# Patient Record
Sex: FEMALE | Race: BLACK OR AFRICAN AMERICAN | Employment: UNEMPLOYED | URBAN - METROPOLITAN AREA
[De-identification: names, ages, dates, MRNs, and addresses within clinical notes are randomized per-mention and may not be internally consistent; named-entity substitution may affect disease eponyms.]

---

## 2017-03-21 ENCOUNTER — HOSPITAL ENCOUNTER (OUTPATIENT)
Age: 47
Discharge: HOME | End: 2017-03-21
Attending: FAMILY MEDICINE
Payer: COMMERCIAL

## 2017-03-21 DIAGNOSIS — M25.469 KNEE SWELLING: ICD-10-CM

## 2017-03-21 DIAGNOSIS — M25.561 RIGHT KNEE PAIN: ICD-10-CM

## 2017-03-21 PROCEDURE — 73721 MRI JNT OF LWR EXTRE W/O DYE: CPT

## 2017-03-29 ENCOUNTER — OFFICE VISIT (OUTPATIENT)
Dept: ORTHOPEDIC SURGERY | Age: 47
End: 2017-03-29

## 2017-03-29 VITALS
WEIGHT: 254 LBS | TEMPERATURE: 98 F | DIASTOLIC BLOOD PRESSURE: 100 MMHG | SYSTOLIC BLOOD PRESSURE: 134 MMHG | HEART RATE: 70 BPM

## 2017-03-29 DIAGNOSIS — S83.281A ACUTE LATERAL MENISCUS TEAR OF RIGHT KNEE, INITIAL ENCOUNTER: ICD-10-CM

## 2017-03-29 DIAGNOSIS — S83.241A ACUTE MEDIAL MENISCUS TEAR, RIGHT, INITIAL ENCOUNTER: Primary | ICD-10-CM

## 2017-03-29 RX ORDER — HYDROCODONE BITARTRATE AND ACETAMINOPHEN 5; 325 MG/1; MG/1
1 TABLET ORAL
Qty: 40 TAB | Refills: 0 | Status: SHIPPED | OUTPATIENT
Start: 2017-03-29 | End: 2017-03-29

## 2017-03-29 NOTE — LETTER
NOTIFICATION RETURN TO WORK / SCHOOL 
 
3/29/2017 1:38 PM 
 
Ms. Ramonita Jiménez [id number] [facility] [address] Shaye [address] [address] To Whom It May Concern: 
 
Ramonita Jiménez is [facility]. She will be under going surgery soon and will remain out of work up until her surgery date. She will then be out of work for 4 weeks following surgery. If there are questions or concerns please have the patient contact our office. Sincerely, Kyle Langley MD

## 2017-03-29 NOTE — PROGRESS NOTES
Salud Guajardo  1970   Chief Complaint   Patient presents with    Knee Pain     Right        HISTORY OF PRESENT ILLNESS  Salud Guajardo is a 40 y.o. female who presents today for evaluation of right knee pain. She rates her pain 10/10 today. Patient was referred to me by Dr. Chelo Fitzgerald for further evaluation. She has completed an MRI of the knee. She recalls a history of falls. She has taken 800 mg Ibuprofen with relief until recently. She notes diffuse pain throughout the knee. She presents to the office with a crutch today. 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 No Known Allergies     History reviewed. No pertinent past medical history. Social History     Social History    Marital status:      Spouse name: N/A    Number of children: N/A    Years of education: N/A     Occupational History    Not on file. Social History Main Topics    Smoking status: Never Smoker    Smokeless tobacco: Not on file    Alcohol use No    Drug use: Not on file    Sexual activity: Not on file     Other Topics Concern    Not on file     Social History Narrative    No narrative on file      History reviewed. No pertinent surgical history. History reviewed. No pertinent family history. Current Outpatient Prescriptions   Medication Sig    HYDROcodone-acetaminophen (NORCO) 5-325 mg per tablet Take 1 Tab by mouth every six (6) hours as needed for Pain. Max Daily Amount: 4 Tabs.  levothyroxine (SYNTHROID) 150 mcg tablet Take 150 mcg by mouth daily.  metoprolol tartrate (LOPRESSOR) 25 mg tablet Take 25 mg by mouth daily.  metFORMIN (GLUCOPHAGE) 1,000 mg tablet Take 1,000 mg by mouth daily.  calcium-vitamin D (OYSTER SHELL) 500 mg(1,250mg) -200 unit per tablet Take 1 Tab by mouth daily.  traMADol (ULTRAM) 50 mg tablet Take 1 Tab by mouth every six (6) hours as needed for Pain. Max Daily Amount: 200 mg. No current facility-administered medications for this visit. REVIEW OF SYSTEM   Patient denies: Weight loss, Fever/Chills, HA, Visual changes, Fatigue, Chest pain, SOB, Abdominal pain, N/V/D/C, Blood in stool or urine, Edema. Pertinent positive as above in HPI. All others were negative    PHYSICAL EXAM:   Visit Vitals    BP (!) 134/100 (BP 1 Location: Left arm, BP Patient Position: Sitting)    Pulse 70    Temp 98 °F (36.7 °C) (Oral)    Wt 254 lb (115.2 kg)     The patient is a well-developed, well-nourished female   in no acute distress. The patient is alert and oriented times three. The patient is alert and oriented times three. Mood and affect are normal.  LYMPHATIC: lymph nodes are not enlarged and are within normal limits  SKIN: normal in color and non tender to palpation. There are no bruises or abrasions noted. NEUROLOGICAL: Motor sensory exam is within normal limits. Reflexes are equal bilaterally. There is normal sensation to pinprick and light touch  MUSCULOSKELETAL:  Examination Right knee   Skin Intact   Range of motion 0-130   Effusion +, Mild   Medial joint line tenderness +   Lateral joint line tenderness -   Tenderness Pes Bursa -   Tenderness insertion MCL -   Tenderness insertion LCL -   Oralias -   Patella crepitus -   Patella grind -   Lachman -   Pivot shift -   Anterior drawer -   Posterior drawer -   Varus stress -   Valgus stress -   Neurovascular Intact   Calf Swelling and Tenderness to Palpation -   Elliot's Test -   Hamstring Cord Tightness -          IMAGING: MRI of the right knee dated 3/21/17 was reviewed and read:   IMPRESSION: Limited study due to motion artifacts. 1. Radial tear in the posterior horn medial meniscus at the meniscal root with  medial subluxation of the medial meniscus. Focal cartilage tear/delamination in  anterior medial femoral condyle  2. Suspect chronic tear or sprain of ACL. 3. Flap tear in the posterior horn lateral meniscus.   4. Joint effusion and small popliteal cyst. Chondromalacia patella, grade 2.  Increased TT-TG distance. IMPRESSION:      ICD-10-CM ICD-9-CM    1. Acute medial meniscus tear, right, initial encounter S83.241A 836.0 HYDROcodone-acetaminophen (NORCO) 5-325 mg per tablet   2. Acute lateral meniscus tear of right knee, initial encounter S83.281A 836.1 HYDROcodone-acetaminophen (NORCO) 5-325 mg per tablet        PLAN: The MRI that accompanied the patient showed arthritic changes in the knee and tears of both the lateral and medial meniscus. She will be given a note to be out of work for 4 weeks after her surgery. I discussed the risks and benefits and potential adverse outcomes of both operative vs non operative treatment of medial and lateral meniscal tears of the right kneewith the patient. Patient wishes to proceed with arthroscopic right knee medial and lateral meniscectomy. Risks of operative intervention include but not limited to bleeding, infection, deep vein thrombosis, pulmonary embolism, death, limb length discrepancy, reflexive sympathetic dystrophy, fat embolism syndrome,damage to blood vessels and nerves, malunion, non-union, delayed union, failure of hardware, post traumatic arthritis, stroke, heart attack, and death. Patient understands that infection may arise and may require numerous surgeries. History and physical exam scheduled for a later date. Follow-up Disposition: Not on File    Scribed by Christelle Dumont [facility]) as dictated by MD MICHAELLE Brice, Dr. Mattie Moulton, confirm that all documentation is accurate.     Mattie Moulton M.D.   [facility]

## 2017-04-03 DIAGNOSIS — S83.281A ACUTE LATERAL MENISCUS TEAR OF RIGHT KNEE, INITIAL ENCOUNTER: ICD-10-CM

## 2017-04-03 DIAGNOSIS — S83.241A ACUTE MEDIAL MENISCUS TEAR OF RIGHT KNEE, INITIAL ENCOUNTER: Primary | ICD-10-CM

## 2017-04-04 ENCOUNTER — HOSPITAL ENCOUNTER (OUTPATIENT)
Dept: LAB | Age: 47
Discharge: HOME | End: 2017-04-04
Payer: MEDICAID

## 2017-04-04 DIAGNOSIS — Z01.818 PREOP EXAMINATION: ICD-10-CM

## 2017-04-04 DIAGNOSIS — Z01.818 PREOP EXAMINATION: Primary | ICD-10-CM

## 2017-04-04 LAB
ANION GAP BLD CALC-SCNC: 6 MMOL/L (ref 3–18)
ATRIAL RATE: 72 BPM
BASOPHILS # BLD AUTO: 0 K/UL (ref 0–0.06)
BASOPHILS # BLD: 0 % (ref 0–2)
BUN SERPL-MCNC: 11 MG/DL (ref 7–18)
BUN/CREAT SERPL: 20 (ref 12–20)
CALCIUM SERPL-MCNC: 9.1 MG/DL (ref 8.5–10.1)
CALCULATED P AXIS, ECG09: 55 DEGREES
CALCULATED R AXIS, ECG10: 16 DEGREES
CALCULATED T AXIS, ECG11: 34 DEGREES
CHLORIDE SERPL-SCNC: 101 MMOL/L (ref 100–108)
CO2 SERPL-SCNC: 32 MMOL/L (ref 21–32)
CREAT SERPL-MCNC: 0.55 MG/DL (ref 0.6–1.3)
DIAGNOSIS, 93000: NORMAL
DIFFERENTIAL METHOD BLD: ABNORMAL
EOSINOPHIL # BLD: 0 K/UL (ref 0–0.4)
EOSINOPHIL NFR BLD: 0 % (ref 0–5)
ERYTHROCYTE [DISTWIDTH] IN BLOOD BY AUTOMATED COUNT: 14.9 % (ref 11.6–14.5)
GLUCOSE SERPL-MCNC: 118 MG/DL (ref 74–99)
HCT VFR BLD AUTO: 38.1 % (ref 35–45)
HGB BLD-MCNC: 12.4 G/DL (ref 12–16)
LYMPHOCYTES # BLD AUTO: 34 % (ref 21–52)
LYMPHOCYTES # BLD: 2.2 K/UL (ref 0.9–3.6)
MCH RBC QN AUTO: 28.1 PG (ref 24–34)
MCHC RBC AUTO-ENTMCNC: 32.5 G/DL (ref 31–37)
MCV RBC AUTO: 86.2 FL (ref 74–97)
MONOCYTES # BLD: 0.5 K/UL (ref 0.05–1.2)
MONOCYTES NFR BLD AUTO: 8 % (ref 3–10)
NEUTS SEG # BLD: 3.8 K/UL (ref 1.8–8)
NEUTS SEG NFR BLD AUTO: 58 % (ref 40–73)
P-R INTERVAL, ECG05: 142 MS
PLATELET # BLD AUTO: 284 K/UL (ref 135–420)
PMV BLD AUTO: 9.8 FL (ref 9.2–11.8)
POTASSIUM SERPL-SCNC: 4 MMOL/L (ref 3.5–5.5)
Q-T INTERVAL, ECG07: 372 MS
QRS DURATION, ECG06: 80 MS
QTC CALCULATION (BEZET), ECG08: 407 MS
RBC # BLD AUTO: 4.42 M/UL (ref 4.2–5.3)
SODIUM SERPL-SCNC: 139 MMOL/L (ref 136–145)
VENTRICULAR RATE, ECG03: 72 BPM
WBC # BLD AUTO: 6.5 K/UL (ref 4.6–13.2)

## 2017-04-04 PROCEDURE — 36415 COLL VENOUS BLD VENIPUNCTURE: CPT | Performed by: ORTHOPAEDIC SURGERY

## 2017-04-04 PROCEDURE — 93005 ELECTROCARDIOGRAM TRACING: CPT

## 2017-04-04 PROCEDURE — 85025 COMPLETE CBC W/AUTO DIFF WBC: CPT | Performed by: ORTHOPAEDIC SURGERY

## 2017-04-04 PROCEDURE — 80048 BASIC METABOLIC PNL TOTAL CA: CPT | Performed by: ORTHOPAEDIC SURGERY

## 2017-04-11 ENCOUNTER — OFFICE VISIT (OUTPATIENT)
Dept: ORTHOPEDIC SURGERY | Age: 47
End: 2017-04-11

## 2017-04-11 VITALS
DIASTOLIC BLOOD PRESSURE: 74 MMHG | HEIGHT: 62 IN | SYSTOLIC BLOOD PRESSURE: 111 MMHG | HEART RATE: 88 BPM | TEMPERATURE: 98.1 F

## 2017-04-11 DIAGNOSIS — S83.241D ACUTE MEDIAL MENISCUS TEAR, RIGHT, SUBSEQUENT ENCOUNTER: Primary | ICD-10-CM

## 2017-04-11 DIAGNOSIS — S83.281D ACUTE LATERAL MENISCUS TEAR OF RIGHT KNEE, SUBSEQUENT ENCOUNTER: ICD-10-CM

## 2017-04-11 RX ORDER — HYDROCODONE BITARTRATE AND ACETAMINOPHEN 7.5; 325 MG/1; MG/1
1-2 TABLET ORAL
Qty: 40 TAB | Refills: 0 | Status: SHIPPED | OUTPATIENT
Start: 2017-04-11 | End: 2020-05-29

## 2017-04-11 RX ORDER — IBUPROFEN 800 MG/1
800 TABLET ORAL
Qty: 60 TAB | Refills: 0 | Status: SHIPPED | OUTPATIENT
Start: 2017-04-11 | End: 2017-04-24 | Stop reason: CLARIF

## 2017-04-12 ENCOUNTER — TELEPHONE (OUTPATIENT)
Dept: ORTHOPEDIC SURGERY | Age: 47
End: 2017-04-12

## 2017-04-12 NOTE — TELEPHONE ENCOUNTER
[address] [place] [address] CALLED FOR DR. Marv Wang. MEGAN SAID THAT THE PATIENT IS HAVING SURGERY DONE ON 04/14/17 BY DR. Marv Wang. MEGAN SAID THE PATIENT IS GOING TO NEED A WALKER UPON DISCHARGE FROM THE HOSPITAL. MEGAN TEL. [phone]. PATIENT TEL. [phone].

## 2017-04-12 NOTE — TELEPHONE ENCOUNTER
Spoke with [employer]. Vilma states that there needs to be an order for a walker placed in [facility] care for pt by the day of discharge.

## 2017-04-13 ENCOUNTER — ANESTHESIA EVENT (OUTPATIENT)
Dept: SURGERY | Age: 47
End: 2017-04-13
Payer: COMMERCIAL

## 2017-04-13 NOTE — DISCHARGE INSTRUCTIONS
Learning About How to Use a [address] Instructions  Using a walker can help you move with less pain and more stability. A walker can help you be more independent and safe as you do your daily activities. Be sure your walker fits you. When you stand up in your normal posture and rest your hands on the walker's hand , your hands should be even with the tops of your legs. Your elbows should be slightly bent. To stay safe when using a walker:  · Look straight ahead, not down at your feet. · Clear away small rugs, cords, or anything else that could cause you to trip, slip, or fall. · Be very careful around pets and small children. They can get in your path when you least expect it. · Be sure the rubber tips on your walker are clean and in good condition to help prevent slipping. · Avoid slick conditions, such as wet floors and snowy or icy driveways. In bad weather, be especially careful on curbs and steps. How to use a walker  Getting started    1. Set the walker at arm's length in front of you, with all four legs on the floor. If your walker has wheels on the front legs, push the walker forward so it's an arm's length in front of you. Walking with a walker    1. Use the handles of the walker for balance as you move your weak or injured leg forward to the middle area of the walker. Don't step all the way to the front. 2. Push straight down on the handles of the walker as you bring your strong leg up, so it is even with your injured leg. 3. Repeat. Getting out of a chair and sitting down    1. To get out of a chair, use both hands and push against the arms of your chair. Then put both hands on your walker. 2. Don't use your walker to help you stand up or sit down. 3. To sit, back up to the chair. Touch the back of your legs to the chair. 4. Support most of your weight on your strong leg, and reach back for the arms of the chair.   5. Slowly and carefully lower yourself into the chair.  Going up and down a curb    The first few times you try this, have another person nearby to steady you if needed. 1. Stand as close to the edge as you can while keeping all four legs of the walker on the surface you're standing on.  2. When you have your balance, move the walker up or down to the surface you are moving to. 3. Push straight down on the handles for balance and to take weight off your injured leg. 4. If you are going up, step up with your stronger leg first, and then bring your weaker or injured leg up to meet it. If you are going down, step down with your weaker leg first, and then bring your stronger leg down to meet it. (Remember \"up with the good, and down with the bad\" to help you lead with the correct leg.)  5. Get your balance again before you start to walk. Follow-up care is a key part of your treatment and safety. Be sure to make and go to all appointments, and call your doctor if you are having problems. It's also a good idea to know your test results and keep a list of the medicines you take. Where can you learn more? Go to http://jerilyn-erin.info/. Enter M137 in the search box to learn more about \"Learning About How to Use a Walker. \"  Current as of: August 4, 2016  Content Version: 11.2  © 0822-7340 [employer]. Care instructions adapted under license by [employer] (which disclaims liability or warranty for this information). If you have questions about a medical condition or this instruction, always ask your healthcare professional. [place] [address] any warranty or liability for your use of this information. Dr. Carrasquillo Trevino Knee Arthroscopy  Postoperative Information    You will be given a prescription for pain medication. It may be taken every 4-6 hours as needed for the first 4-5 days. You may elevate your leg and place an ice pack on top of the dressing in  order to prevent swelling.     A soft bandage was placed on your knee to soak up blood and fluid. You may take the bandage off the day after surgery, carefully cleaning the incision sites with peroxide. Band-Aids should be used for the first 4-5 days over each incision. There are 2 small incisions in your knee that may be sore and develop bruising over the next several days. This should resolve over the next few weeks. No special care will be needed. You should expect swelling in the area. You may elevate your leg and apply an icepack on top of the dressing to help minimize the swelling. Deep massage to the lower leg may also be utilized. It is safe to take a shower two days after surgery. You may begin bearing weight on your leg with the use of crutches for the first 4-5 days. Apply as much weight as tolerated so that at the end of 4-5 days, you can walk without crutches. Avoid prolonged walking or standing during the first few weeks after surgery. During your first week please work on gentle range of motion of your knee. Even though your incisions are small, there has been an operation inside and around the knee joint. Complete healing may take several months. If you have a high temperature, unexpected pain, redness or swelling, or any drainage around your knee area, please call my office immediately. Please make an appointment to return to my office in one week.     Dr. Brody Hawthorne office number Zoila Rhyme from Nurse    The following personal items are in your possession at time of discharge:    Dental Appliances: None  Visual Aid: Glasses     Home Medications: None  Jewelry: None  Clothing: Undergarments, Footwear (dress)  Other Valuables: None             PATIENT INSTRUCTIONS:    After general anesthesia or intravenous sedation, for 24 hours or while taking prescription Narcotics:  · Limit your activities  · Do not drive and operate hazardous machinery  · Do not make important personal or business decisions  · Do  not drink alcoholic beverages  · If you have not urinated within 8 hours after discharge, please contact your surgeon on call. Report the following to your surgeon:  · Excessive pain, swelling, redness or odor of or around the surgical area  · Temperature over 100.5  · Nausea and vomiting lasting longer than 4 hours or if unable to take medications  · Any signs of decreased circulation or nerve impairment to extremity: change in color, persistent  numbness, tingling, coldness or increase pain  · Any questions              *  Please give a list of your current medications to your Primary Care Provider. *  Please update this list whenever your medications are discontinued, doses are      changed, or new medications (including over-the-counter products) are added. *  Please carry medication information at all times in case of emergency situations. These are general instructions for a healthy lifestyle:    No smoking/ No tobacco products/ Avoid exposure to second hand smoke    Surgeon General's Warning:  Quitting smoking now greatly reduces serious risk to your health. Obesity, smoking, and sedentary lifestyle greatly increases your risk for illness    A healthy diet, regular physical exercise & weight monitoring are important for maintaining a healthy lifestyle    You may be retaining fluid if you have a history of heart failure or if you experience any of the following symptoms:  Weight gain of 3 pounds or more overnight or 5 pounds in a week, increased swelling in our hands or feet or shortness of breath while lying flat in bed. Please call your doctor as soon as you notice any of these symptoms; do not wait until your next office visit.     Recognize signs and symptoms of STROKE:    F-face looks uneven    A-arms unable to move or move unevenly    S-speech slurred or non-existent    T-time-call 911 as soon as signs and symptoms begin-DO NOT go       Back to bed or wait to see if you get better-TIME IS BRAIN. Warning Signs of HEART ATTACK     Call 911 if you have these symptoms:   Chest discomfort. Most heart attacks involve discomfort in the center of the chest that lasts more than a few minutes, or that goes away and comes back. It can feel like uncomfortable pressure, squeezing, fullness, or pain.  Discomfort in other areas of the upper body. Symptoms can include pain or discomfort in one or both arms, the back, neck, jaw, or stomach.  Shortness of breath with or without chest discomfort.  Other signs may include breaking out in a cold sweat, nausea, or lightheadedness. Don't wait more than five minutes to call 911 - MINUTES MATTER! Fast action can save your life. Calling 911 is almost always the fastest way to get lifesaving treatment. Emergency Medical Services staff can begin treatment when they arrive -- up to an hour sooner than if someone gets to the hospital by car. The discharge information has been reviewed with the patient and parent. The patient and parent verbalized understanding. Discharge medications reviewed with the patient and caregiver and appropriate educational materials and side effects teaching were provided.

## 2017-04-14 ENCOUNTER — ANESTHESIA (OUTPATIENT)
Dept: SURGERY | Age: 47
End: 2017-04-14
Payer: COMMERCIAL

## 2017-04-14 ENCOUNTER — HOSPITAL ENCOUNTER (OUTPATIENT)
Age: 47
Setting detail: OUTPATIENT SURGERY
Discharge: HOME | End: 2017-04-14
Attending: ORTHOPAEDIC SURGERY | Admitting: ORTHOPAEDIC SURGERY
Payer: COMMERCIAL

## 2017-04-14 VITALS
RESPIRATION RATE: 20 BRPM | HEIGHT: 62 IN | SYSTOLIC BLOOD PRESSURE: 115 MMHG | BODY MASS INDEX: 46.38 KG/M2 | WEIGHT: 252 LBS | DIASTOLIC BLOOD PRESSURE: 65 MMHG | HEART RATE: 77 BPM | TEMPERATURE: 98.4 F | OXYGEN SATURATION: 95 %

## 2017-04-14 LAB
GLUCOSE BLD STRIP.AUTO-MCNC: 101 MG/DL (ref 70–110)
GLUCOSE BLD STRIP.AUTO-MCNC: 105 MG/DL (ref 70–110)
HCG UR QL: NEGATIVE

## 2017-04-14 PROCEDURE — 82962 GLUCOSE BLOOD TEST: CPT

## 2017-04-14 PROCEDURE — 74011250636 HC RX REV CODE- 250/636

## 2017-04-14 PROCEDURE — 74011000250 HC RX REV CODE- 250

## 2017-04-14 PROCEDURE — 76210000026 HC REC RM PH II 1 TO 1.5 HR: Performed by: ORTHOPAEDIC SURGERY

## 2017-04-14 PROCEDURE — 77030032490 HC SLV COMPR SCD KNE COVD -B: Performed by: ORTHOPAEDIC SURGERY

## 2017-04-14 PROCEDURE — 76010000138 HC OR TIME 0.5 TO 1 HR: Performed by: ORTHOPAEDIC SURGERY

## 2017-04-14 PROCEDURE — 81025 URINE PREGNANCY TEST: CPT

## 2017-04-14 PROCEDURE — 74011250636 HC RX REV CODE- 250/636: Performed by: PHYSICIAN ASSISTANT

## 2017-04-14 PROCEDURE — 74011000250 HC RX REV CODE- 250: Performed by: ORTHOPAEDIC SURGERY

## 2017-04-14 PROCEDURE — 76060000032 HC ANESTHESIA 0.5 TO 1 HR: Performed by: ORTHOPAEDIC SURGERY

## 2017-04-14 PROCEDURE — 77030002933 HC SUT MCRYL J&J -A: Performed by: ORTHOPAEDIC SURGERY

## 2017-04-14 PROCEDURE — 77030008574 HC TBNG SUC IRR STRY -B: Performed by: ORTHOPAEDIC SURGERY

## 2017-04-14 PROCEDURE — 77030010509 HC AIRWY LMA MSK TELE -A: Performed by: NURSE ANESTHETIST, CERTIFIED REGISTERED

## 2017-04-14 PROCEDURE — 74011250637 HC RX REV CODE- 250/637: Performed by: NURSE ANESTHETIST, CERTIFIED REGISTERED

## 2017-04-14 PROCEDURE — 74011250636 HC RX REV CODE- 250/636: Performed by: ANESTHESIOLOGY

## 2017-04-14 PROCEDURE — 74011250636 HC RX REV CODE- 250/636: Performed by: NURSE ANESTHETIST, CERTIFIED REGISTERED

## 2017-04-14 PROCEDURE — 74011250637 HC RX REV CODE- 250/637: Performed by: ANESTHESIOLOGY

## 2017-04-14 PROCEDURE — 76210000016 HC OR PH I REC 1 TO 1.5 HR: Performed by: ORTHOPAEDIC SURGERY

## 2017-04-14 PROCEDURE — 77030020782 HC GWN BAIR PAWS FLX 3M -B: Performed by: ORTHOPAEDIC SURGERY

## 2017-04-14 RX ORDER — CEFAZOLIN SODIUM 2 G/50ML
2 SOLUTION INTRAVENOUS ONCE
Status: COMPLETED | OUTPATIENT
Start: 2017-04-14 | End: 2017-04-14

## 2017-04-14 RX ORDER — SODIUM CHLORIDE 0.9 % (FLUSH) 0.9 %
5-10 SYRINGE (ML) INJECTION EVERY 8 HOURS
Status: DISCONTINUED | OUTPATIENT
Start: 2017-04-14 | End: 2017-04-14 | Stop reason: HOSPADM

## 2017-04-14 RX ORDER — BUPIVACAINE HYDROCHLORIDE AND EPINEPHRINE 5; 5 MG/ML; UG/ML
INJECTION, SOLUTION EPIDURAL; INTRACAUDAL; PERINEURAL AS NEEDED
Status: DISCONTINUED | OUTPATIENT
Start: 2017-04-14 | End: 2017-04-14 | Stop reason: HOSPADM

## 2017-04-14 RX ORDER — MAGNESIUM SULFATE 100 %
4 CRYSTALS MISCELLANEOUS AS NEEDED
Status: DISCONTINUED | OUTPATIENT
Start: 2017-04-14 | End: 2017-04-14 | Stop reason: HOSPADM

## 2017-04-14 RX ORDER — ONDANSETRON 2 MG/ML
4 INJECTION INTRAMUSCULAR; INTRAVENOUS AS NEEDED
Status: DISCONTINUED | OUTPATIENT
Start: 2017-04-14 | End: 2017-04-14 | Stop reason: HOSPADM

## 2017-04-14 RX ORDER — ONDANSETRON 2 MG/ML
4 INJECTION INTRAMUSCULAR; INTRAVENOUS ONCE
Status: COMPLETED | OUTPATIENT
Start: 2017-04-14 | End: 2017-04-14

## 2017-04-14 RX ORDER — SODIUM CHLORIDE, SODIUM LACTATE, POTASSIUM CHLORIDE, CALCIUM CHLORIDE 600; 310; 30; 20 MG/100ML; MG/100ML; MG/100ML; MG/100ML
75 INJECTION, SOLUTION INTRAVENOUS CONTINUOUS
Status: DISCONTINUED | OUTPATIENT
Start: 2017-04-14 | End: 2017-04-14 | Stop reason: HOSPADM

## 2017-04-14 RX ORDER — SCOLOPAMINE TRANSDERMAL SYSTEM 1 MG/1
1.5 PATCH, EXTENDED RELEASE TRANSDERMAL
Status: DISCONTINUED | OUTPATIENT
Start: 2017-04-14 | End: 2017-04-14 | Stop reason: HOSPADM

## 2017-04-14 RX ORDER — FAMOTIDINE 20 MG/1
20 TABLET, FILM COATED ORAL ONCE
Status: COMPLETED | OUTPATIENT
Start: 2017-04-14 | End: 2017-04-14

## 2017-04-14 RX ORDER — MIDAZOLAM HYDROCHLORIDE 1 MG/ML
INJECTION, SOLUTION INTRAMUSCULAR; INTRAVENOUS AS NEEDED
Status: DISCONTINUED | OUTPATIENT
Start: 2017-04-14 | End: 2017-04-14 | Stop reason: HOSPADM

## 2017-04-14 RX ORDER — SODIUM CHLORIDE 0.9 % (FLUSH) 0.9 %
5-10 SYRINGE (ML) INJECTION AS NEEDED
Status: DISCONTINUED | OUTPATIENT
Start: 2017-04-14 | End: 2017-04-14 | Stop reason: HOSPADM

## 2017-04-14 RX ORDER — LIDOCAINE HYDROCHLORIDE 20 MG/ML
INJECTION, SOLUTION EPIDURAL; INFILTRATION; INTRACAUDAL; PERINEURAL AS NEEDED
Status: DISCONTINUED | OUTPATIENT
Start: 2017-04-14 | End: 2017-04-14 | Stop reason: HOSPADM

## 2017-04-14 RX ORDER — FENTANYL CITRATE 50 UG/ML
INJECTION, SOLUTION INTRAMUSCULAR; INTRAVENOUS AS NEEDED
Status: DISCONTINUED | OUTPATIENT
Start: 2017-04-14 | End: 2017-04-14 | Stop reason: HOSPADM

## 2017-04-14 RX ORDER — INSULIN LISPRO 100 [IU]/ML
INJECTION, SOLUTION INTRAVENOUS; SUBCUTANEOUS ONCE
Status: DISCONTINUED | OUTPATIENT
Start: 2017-04-14 | End: 2017-04-14 | Stop reason: HOSPADM

## 2017-04-14 RX ORDER — DEXTROSE 50 % IN WATER (D50W) INTRAVENOUS SYRINGE
25-50 AS NEEDED
Status: DISCONTINUED | OUTPATIENT
Start: 2017-04-14 | End: 2017-04-14 | Stop reason: HOSPADM

## 2017-04-14 RX ORDER — PROPOFOL 10 MG/ML
INJECTION, EMULSION INTRAVENOUS AS NEEDED
Status: DISCONTINUED | OUTPATIENT
Start: 2017-04-14 | End: 2017-04-14 | Stop reason: HOSPADM

## 2017-04-14 RX ORDER — LIDOCAINE HYDROCHLORIDE 10 MG/ML
0.1 INJECTION, SOLUTION EPIDURAL; INFILTRATION; INTRACAUDAL; PERINEURAL AS NEEDED
Status: DISCONTINUED | OUTPATIENT
Start: 2017-04-14 | End: 2017-04-14 | Stop reason: HOSPADM

## 2017-04-14 RX ORDER — HYDROMORPHONE HYDROCHLORIDE 2 MG/ML
0.5 INJECTION, SOLUTION INTRAMUSCULAR; INTRAVENOUS; SUBCUTANEOUS
Status: COMPLETED | OUTPATIENT
Start: 2017-04-14 | End: 2017-04-14

## 2017-04-14 RX ORDER — ONDANSETRON 2 MG/ML
INJECTION INTRAMUSCULAR; INTRAVENOUS AS NEEDED
Status: DISCONTINUED | OUTPATIENT
Start: 2017-04-14 | End: 2017-04-14 | Stop reason: HOSPADM

## 2017-04-14 RX ORDER — DEXAMETHASONE SODIUM PHOSPHATE 4 MG/ML
INJECTION, SOLUTION INTRA-ARTICULAR; INTRALESIONAL; INTRAMUSCULAR; INTRAVENOUS; SOFT TISSUE AS NEEDED
Status: DISCONTINUED | OUTPATIENT
Start: 2017-04-14 | End: 2017-04-14 | Stop reason: HOSPADM

## 2017-04-14 RX ADMIN — HYDROMORPHONE HYDROCHLORIDE 0.5 MG: 2 INJECTION, SOLUTION INTRAMUSCULAR; INTRAVENOUS; SUBCUTANEOUS at 15:29

## 2017-04-14 RX ADMIN — ONDANSETRON 4 MG: 2 INJECTION INTRAMUSCULAR; INTRAVENOUS at 15:29

## 2017-04-14 RX ADMIN — PROPOFOL 150 MG: 10 INJECTION, EMULSION INTRAVENOUS at 14:27

## 2017-04-14 RX ADMIN — ONDANSETRON 4 MG: 2 INJECTION INTRAMUSCULAR; INTRAVENOUS at 14:55

## 2017-04-14 RX ADMIN — ONDANSETRON 4 MG: 2 INJECTION INTRAMUSCULAR; INTRAVENOUS at 16:53

## 2017-04-14 RX ADMIN — HYDROMORPHONE HYDROCHLORIDE 0.5 MG: 2 INJECTION, SOLUTION INTRAMUSCULAR; INTRAVENOUS; SUBCUTANEOUS at 15:49

## 2017-04-14 RX ADMIN — HYDROMORPHONE HYDROCHLORIDE 0.5 MG: 2 INJECTION, SOLUTION INTRAMUSCULAR; INTRAVENOUS; SUBCUTANEOUS at 15:59

## 2017-04-14 RX ADMIN — CEFAZOLIN SODIUM 2 G: 2 SOLUTION INTRAVENOUS at 14:21

## 2017-04-14 RX ADMIN — FENTANYL CITRATE 25 MCG: 50 INJECTION, SOLUTION INTRAMUSCULAR; INTRAVENOUS at 14:36

## 2017-04-14 RX ADMIN — SODIUM CHLORIDE, SODIUM LACTATE, POTASSIUM CHLORIDE, AND CALCIUM CHLORIDE 75 ML/HR: 600; 310; 30; 20 INJECTION, SOLUTION INTRAVENOUS at 12:35

## 2017-04-14 RX ADMIN — FENTANYL CITRATE 25 MCG: 50 INJECTION, SOLUTION INTRAMUSCULAR; INTRAVENOUS at 14:27

## 2017-04-14 RX ADMIN — FAMOTIDINE 20 MG: 20 TABLET ORAL at 12:04

## 2017-04-14 RX ADMIN — DEXAMETHASONE SODIUM PHOSPHATE 4 MG: 4 INJECTION, SOLUTION INTRA-ARTICULAR; INTRALESIONAL; INTRAMUSCULAR; INTRAVENOUS; SOFT TISSUE at 14:30

## 2017-04-14 RX ADMIN — HYDROMORPHONE HYDROCHLORIDE 0.5 MG: 2 INJECTION, SOLUTION INTRAMUSCULAR; INTRAVENOUS; SUBCUTANEOUS at 15:39

## 2017-04-14 RX ADMIN — MIDAZOLAM HYDROCHLORIDE 2 MG: 1 INJECTION, SOLUTION INTRAMUSCULAR; INTRAVENOUS at 14:21

## 2017-04-14 RX ADMIN — LIDOCAINE HYDROCHLORIDE 60 MG: 20 INJECTION, SOLUTION EPIDURAL; INFILTRATION; INTRACAUDAL; PERINEURAL at 14:27

## 2017-04-14 NOTE — H&P (VIEW-ONLY)
HISTORY AND PHYSICAL          Patient: Christopher Lyles                MRN: [id number]       SSN: [id number]  YOB: 1970          AGE: 40 y.o. SEX: female      Patient scheduled for:  Right knee arthroscopic partial medial and lateral menisectomy    Surgeon: Manuela Marquez MD    ANESTHESIA TYPE:  General    HISTORY:     The patient was seen in the office today for a preoperative history and physical for an upcoming above listed surgery. The patient is a pleasant 40 y.o. female who has a history of right knee pain. Pain worse with activity better with rest. Has painful catching sensation with ambulation. Due to the current findings, affected activity of daily living and continued pain and discomfort, surgical intervention is indicated. The alternatives, risks, and complications, including but not limited to infection, blood loss, need for blood transfusion, neurovascular damage, kandi-incisional numbness, subcutaneous hematoma, bone fracture, anesthetic complications, DVT, PE, death, RSD, postoperative stiffness and pain, possible surgical scar, delayed healing and nonhealing, reflexive sympathetic dystrophy, damage to blood vessels and nerves, need for more surgery, MI, and stroke,  failure of hardware, gait disturbances,have been discussed. The patient understands and wishes to proceed with surgery. PAST MEDICAL HISTORY:     Past Medical History:   Diagnosis Date    Diabetes ([facility] [address]     Graves disease     Tachycardia     due to Graves Disease, on Metoprolol       CURRENT MEDICATIONS:     Current Outpatient Prescriptions   Medication Sig Dispense Refill    HYDROcodone-acetaminophen (NORCO) 7.5-325 mg per tablet Take 1-2 Tabs by mouth every four (4) hours as needed for Pain. Max Daily Amount: 12 Tabs. Do not take until after surgery 40 Tab 0    levonorgestrel (MIRENA) 20 mcg/24 hr (5 years) IUD 1 Each by IntraUTERine route once.       levothyroxine (SYNTHROID) 150 mcg tablet Take 150 mcg by mouth daily.  metoprolol tartrate (LOPRESSOR) 25 mg tablet Take 25 mg by mouth daily.  metFORMIN (GLUCOPHAGE) 1,000 mg tablet Take 1,000 mg by mouth daily.  calcium-vitamin D (OYSTER SHELL) 500 mg(1,250mg) -200 unit per tablet Take 1 Tab by mouth daily. ALLERGIES:     No Known Allergies      SURGICAL HISTORY:     Past Surgical History:   Procedure Laterality Date    HX THYROIDECTOMY  05/13/2016       SOCIAL HISTORY:     Social History     Social History    Marital status: SINGLE     Spouse name: N/A    Number of children: N/A    Years of education: N/A     Social History Main Topics    Smoking status: Never Smoker    Smokeless tobacco: Never Used    Alcohol use No    Drug use: No    Sexual activity: Not on file     Other Topics Concern    Not on file     Social History Narrative       FAMILY HISTORY:     No family history on file. REVIEW OF SYSTEMS:     Negative for fevers, chills, chest pain, shortness of breath, weight loss, recent illness     General: Negative for fever and chills. No unexpected change in weight. Denies fatigue. No change in appetite. Skin: Negative for rash or itching. HEENT: Negative for congestion, sore throat, neck pain and neck stiffness. No change in vision or hearing. Hasn't noted any enlarged lymph nodes in the neck. Cardiovascular:  Negative for chest pain and palpitations. Has not noted pedal edema. Respiratory: Negative for cough, colds, sinus, hemoptysis, shortness of breath and wheezing. Gastrointestinal: Negative for nausea and vomiting, rectal bleeding, coffee ground emesis, abdominal pain, diarrhea and constipation. Genitourinary: Negative for dysuria, frequency urgency, or burning on micturition. No flank pain, no foul smelling urine, no difficulty with initiating urination. Hematological: Negative for bleeding or easy bruising. Musculoskeletal: Negative  for arthralgias, back pain or neck pain.   Neurological: Negative for dizziness, seizures or syncopal episodes. Denies headaches. Endocrine: Denies excessive thirst.  No heat/cold intolerance. Psychiatric: Negative for depression or insomnia. PHYSICAL EXAMINATION:     VITALS:   Visit Vitals    LMP 02/22/2017     GEN:  Well developed, well nourished 40 y.o. female in no acute distress. HEENT: Normocephalic and atraumatic. Eyes: Conjunctivae and EOM are normal.Pupils are equal, round, and reactive to light. External ear normal appearance, external nose normal appearing. Mouth/Throat: Oropharynx is clear and moist, able to handle oral secretions w/out difficulty, airway patent  NECK: Supple. Normal ROM, No lymphadenopathy. Trachea is midline. No bruising, swelling or deformity  RESP: Clear to auscultation bilaterally. No wheezes, rales, rhonchi. Normal effort and breath sounds. No respiratory distress  CARDIO:  Normal rate, regular rhythm and normal heart sounds. No MGR. ABDOMEN: Soft, non-tender, non-distended, normoactive bowel sounds in all four quadrants. There is no tenderness. There is no rebound and no guarding. BACK: No CVA or spinal tenderness  BREAST:  Deferred  PELVIC:    Deferred   RECTAL:  Deferred   :           Deferred  EXTREMITIES: EXAMINATION OF: right knee  Examination Right knee   Skin Intact   Range of motion 0-120   Effusion +   Medial joint line tenderness +   Lateral joint line tenderness +   Tenderness Pes Bursa -   Tenderness insertion MCL -   Tenderness insertion LCL -   Oralias +   Patella crepitus -   Patella grind -   Lachman -   Pivot shift -   Anterior drawer -   Posterior drawer -   Varus stress -   Valgus stress -   Neurovascular Intact   Calf Swelling and Tenderness to Palpation -   Elliot's Test -   Hamstring Cord Tightness -       NEUROVASCULAR: Sensation intact to light touch and strength grossly intact and symmetrical. No nystagmus. Positive distal pulses and capillary refill. DVT ASSESSMENT:  There is not  calf tenderness.  No evidence of DVT seen on physical exam.  MOTOR: In tact  PSYCH: Alert an oriented to person, place and time. Mood, memory, affect, behavior and judgment normal       RADIOGRAPHS & DIAGNOSTIC STUDIES:     MRI/xray reveals : 1. Radial tear in the posterior horn medial meniscus at the meniscal root with  medial subluxation of the medial meniscus. Focal cartilage tear/delamination in  anterior medial femoral condyle  2. Suspect chronic tear or sprain of ACL. 3. Flap tear in the posterior horn lateral meniscus. 4. Joint effusion and small popliteal cyst. Chondromalacia patella, grade 2. Increased TT-TG distance. LABS:       @  CBC:   Lab Results   Component Value Date/Time    WBC 6.5 04/04/2017 10:18 AM    RBC 4.42 04/04/2017 10:18 AM    HGB 12.4 04/04/2017 10:18 AM    HCT 38.1 04/04/2017 10:18 AM    PLATELET 558 64/70/8663 10:18 AM    and BMP:   Lab Results   Component Value Date/Time    Glucose 118 04/04/2017 10:18 AM    Sodium 139 04/04/2017 10:18 AM    Potassium 4.0 04/04/2017 10:18 AM    Chloride 101 04/04/2017 10:18 AM    CO2 32 04/04/2017 10:18 AM    BUN 11 04/04/2017 10:18 AM    Creatinine 0.55 04/04/2017 10:18 AM    Calcium 9.1 04/04/2017 10:18 AM   @    Preoperative labs were reviewed and are substantially within normal limits   EKG:Normal sinus rhythm   Normal ECG   No previous ECGs available   Confirmed by Regi Lopez MD, [facility] ([id number]) on 4/4/2017 4:20:09 PM       ASSESSMENT:       Encounter Diagnoses   Name Primary?  Acute medial meniscus tear, right, subsequent encounter Yes    Acute lateral meniscus tear of right knee, subsequent encounter        PLAN:     Again, the alternatives, risks, and complications, as well as expected outcome were discussed. The patient understands and agrees to proceed with right knee arthroscopic partial medial and lateral menisectomy.  Patient given orders listed below:    Orders Placed This Encounter    HYDROcodone-acetaminophen (NORCO) 7.5-325 mg per tablet         Staci oR Alfonso Clement PA-C  4/11/2017  10:47 AM

## 2017-04-14 NOTE — PERIOP NOTES
[address] April 14, 2017       RE: Cristofer Merrill      To Whom It May Concern,    This is to certify that Cristofer Merrill may may return to work on 4/24/17. Please feel free to contact my office if you have any questions or concerns. Thank you for your assistance in this matter.       Sincerely,  OLEG [address]  [phone]

## 2017-04-14 NOTE — IP AVS SNAPSHOT
Mirta Rivera 
 
 
 [address] [address] 
[phone] Patient: Vandana Medrano MRN: [id number] You are allergic to the following No active allergies Recent Documentation Height Weight BMI OB Status Smoking Status 1.575 m 114.3 kg 46.09 kg/m2 Having regular periods Never Smoker Emergency Contacts Name Discharge Info Relation Home Work Mobile Trish [place] DISCHARGE CAREGIVER [3] Mother [14] [phone] About your hospitalization You were admitted on:  2017 You last received care in the:  [facility] PAC[facility] You were discharged on:  2017 Unit phone number:  [phone] Why you were hospitalized Your primary diagnosis was:  Not on File Providers Seen During Your Hospitalizations Provider Role Specialty Primary office phone Faith Paul MD Attending Provider Orthopedic Surgery [phone] Your Primary Care Physician (PCP) Primary Care Physician Office Phone Office Fax Prerna Zee [phone] Follow-up Information Follow up With Details Comments Contact Info Jun Rocha MD   [facility] [address] [address] 
[phone] Faith Paul MD Schedule an appointment as soon as possible for a visit in 1 week(s) Follow up in one week [address] [place] [address] 
[phone] Your Appointments 2017 10:45 AM EDT  
POST OP with Renae Busby PA-C  
4 [facility], [address] and [facility] ([facility]) [address], [address] [place] [address]  
[phone] Current Discharge Medication List  
  
CONTINUE these medications which have NOT CHANGED Dose & Instructions Dispensing Information Comments Morning Noon Evening Bedtime calcium-vitamin D 500 mg(1,250mg) -200 unit per tablet Commonly known as:  OYSTER SHELL Your last dose was: Your next dose is:    
   
   
 Dose:  1 Tab Take 1 Tab by mouth daily. Refills:  0 HYDROcodone-acetaminophen 7.5-325 mg per tablet Commonly known as:  [place] Your last dose was: Your next dose is:    
   
   
 Dose:  1-2 Tab Take 1-2 Tabs by mouth every four (4) hours as needed for Pain. Max Daily Amount: 12 Tabs. Do not take until after surgery Quantity:  40 Tab Refills:  0  
     
   
   
   
  
 ibuprofen 800 mg tablet Commonly known as:  MOTRIN Your last dose was: Your next dose is:    
   
   
 Dose:  800 mg Take 1 Tab by mouth three (3) times daily (with meals). Quantity:  60 Tab Refills:  0  
     
   
   
   
  
 levothyroxine 150 mcg tablet Commonly known as:  SYNTHROID Your last dose was: Your next dose is:    
   
   
 Dose:  150 mcg Take 150 mcg by mouth daily. Refills:  0  
     
   
   
   
  
 metFORMIN 1,000 mg tablet Commonly known as:  GLUCOPHAGE Your last dose was: Your next dose is:    
   
   
 Dose:  1000 mg Take 1,000 mg by mouth daily. Refills:  0  
     
   
   
   
  
 metoprolol tartrate 25 mg tablet Commonly known as:  LOPRESSOR Your last dose was: Your next dose is:    
   
   
 Dose:  25 mg Take 25 mg by mouth daily. Refills:  0 MIRENA 20 mcg/24 hr (5 years) IUD Generic drug:  levonorgestrel Your last dose was: Your next dose is:    
   
   
 Dose:  1 Each  
1 Each by IntraUTERine route once. Refills:  0 Discharge Instructions Learning About How to Use a Albesa Poot Your Care Instructions Using a walker can help you move with less pain and more stability. A walker can help you be more independent and safe as you do your daily activities. Be sure your walker fits you. When you stand up in your normal posture and rest your hands on the walker's hand , your hands should be even with the tops of your legs. Your elbows should be slightly bent. To stay safe when using a walker: · Look straight ahead, not down at your feet. · Clear away small rugs, cords, or anything else that could cause you to trip, slip, or fall. · Be very careful around pets and small children. They can get in your path when you least expect it. · Be sure the rubber tips on your walker are clean and in good condition to help prevent slipping. · Avoid slick conditions, such as wet floors and snowy or icy driveways. In bad weather, be especially careful on curbs and steps. How to use a walker Getting started 1. Set the walker at arm's length in front of you, with all four legs on the floor. If your walker has wheels on the front legs, push the walker forward so it's an arm's length in front of you. Walking with a walker 1. Use the handles of the walker for balance as you move your weak or injured leg forward to the middle area of the walker. Don't step all the way to the front. 2. Push straight down on the handles of the walker as you bring your strong leg up, so it is even with your injured leg. 3. Repeat. Getting out of a chair and sitting down 1. To get out of a chair, use both hands and push against the arms of your chair. Then put both hands on your walker. 2. Don't use your walker to help you stand up or sit down. 3. To sit, back up to the chair. Touch the back of your legs to the chair. 4. Support most of your weight on your strong leg, and reach back for the arms of the chair. 5. Slowly and carefully lower yourself into the chair. Going up and down a curb The first few times you try this, have another person nearby to steady you if needed.  
1. Stand as close to the edge as you can while keeping all four legs of the walker on the surface you're standing on. 
2. When you have your balance, move the walker up or down to the surface you are moving to. 3. Push straight down on the handles for balance and to take weight off your injured leg. 4. If you are going up, step up with your stronger leg first, and then bring your weaker or injured leg up to meet it. If you are going down, step down with your weaker leg first, and then bring your stronger leg down to meet it. (Remember \"up with the good, and down with the bad\" to help you lead with the correct leg.) 5. Get your balance again before you start to walk. Follow-up care is a key part of your treatment and safety. Be sure to make and go to all appointments, and call your doctor if you are having problems. It's also a good idea to know your test results and keep a list of the medicines you take. Where can you learn more? Go to http://jerilyn-erin.info/. Enter M137 in the search box to learn more about \"Learning About How to Use a Walker. \" Current as of: August 4, 2016 Content Version: 11.2 © 5480-5318 [employer]. Care instructions adapted under license by [employer] (which disclaims liability or warranty for this information). If you have questions about a medical condition or this instruction, always ask your healthcare professional. [address] [address] any warranty or liability for your use of this information. Dr. Andreea Adame Postoperative Information You will be given a prescription for pain medication. It may be taken every 4-6 hours as needed for the first 4-5 days. You may elevate your leg and place an ice pack on top of the dressing in 
order to prevent swelling. A soft bandage was placed on your knee to soak up blood and fluid.  You may take the bandage off the day after surgery, carefully cleaning the incision sites with peroxide. Band-Aids should be used for the first 4-5 days over each incision. There are 2 small incisions in your knee that may be sore and develop bruising over the next several days. This should resolve over the next few weeks. No special care will be needed. You should expect swelling in the area. You may elevate your leg and apply an icepack on top of the dressing to help minimize the swelling. Deep massage to the lower leg may also be utilized. It is safe to take a shower two days after surgery. You may begin bearing weight on your leg with the use of crutches for the first 4-5 days. Apply as much weight as tolerated so that at the end of 4-5 days, you can walk without crutches. Avoid prolonged walking or standing during the first few weeks after surgery. During your first week please work on gentle range of motion of your knee. Even though your incisions are small, there has been an operation inside and around the knee joint. Complete healing may take several months. If you have a high temperature, unexpected pain, redness or swelling, or any drainage around your knee area, please call my office immediately. Please make an appointment to return to my office in one week. Dr. Jesse Wagner office number [phone] DISCHARGE SUMMARY from Nurse The following personal items are in your possession at time of discharge: 
 
Dental Appliances: None Visual Aid: Glasses Home Medications: None Jewelry: None Clothing: Undergarments, Footwear (dress) Other Valuables: None PATIENT INSTRUCTIONS: 
 
 
F-face looks uneven A-arms unable to move or move unevenly S-speech slurred or non-existent T-time-call 911 as soon as signs and symptoms begin-DO NOT go Back to bed or wait to see if you get better-TIME IS BRAIN. Warning Signs of HEART ATTACK Call 911 if you have these symptoms: ? Chest discomfort. Most heart attacks involve discomfort in the center of the chest that lasts more than a few minutes, or that goes away and comes back. It can feel like uncomfortable pressure, squeezing, fullness, or pain. ? Discomfort in other areas of the upper body. Symptoms can include pain or discomfort in one or both arms, the back, neck, jaw, or stomach. ? Shortness of breath with or without chest discomfort. ? Other signs may include breaking out in a cold sweat, nausea, or lightheadedness. Don't wait more than five minutes to call [address]! Fast action can save your life. Calling 911 is almost always the fastest way to get lifesaving treatment. Emergency Medical Services staff can begin treatment when they arrive  up to an hour sooner than if someone gets to the hospital by car. The discharge information has been reviewed with the patient and parent. The patient and parent verbalized understanding. Discharge medications reviewed with the patient and caregiver and appropriate educational materials and side effects teaching were provided. Discharge Orders None Introducing \[facility]\"" & HEALTH SERVICES! [facility] introduces BioData patient portal. Now you can access parts of your medical record, email your doctor's office, and request medication refills online. 1. In your internet browser, go to [email or web address] 2. Click on the First Time User? Click Here link in the Sign In box. You will see the New Member Sign Up page. 3. Enter your BioData Access Code exactly as it appears below. You will not need to use this code after youve completed the sign-up process. If you do not sign up before the expiration date, you must request a new code. · [employer] Access Code: [email or web address] Expires: 6/13/2017  1:04 PM 
 
4.  Enter the last four digits of your Social Security Number (xxxx) and Date of Birth (mm/dd/yyyy) as indicated and click Submit. You will be taken to the next sign-up page. 5. Create a Zoona ID. This will be your Z[employer]a login ID and cannot be changed, so think of one that is secure and easy to remember. 6. Create a Zoona password. You can change your password at any time. 7. Enter your Password Reset Question and Answer. This can be used at a later time if you forget your password. 8. Enter your e-mail address. You will receive e-mail notification when new information is available in [address] 9. Click Sign Up. You can now view and download portions of your medical record. 10. Click the Download Summary menu link to download a portable copy of your medical information. If you have questions, please visit the Frequently Asked Questions section of the [employer] website. Remember, [employer]a is NOT to be used for urgent needs. For medical emergencies, dial 911. Now available from your iPhone and Android! General Information Please provide this summary of care documentation to your next provider. Patient Signature:  ____________________________________________________________ Date:  ____________________________________________________________  
  
[facility] Provider Signature:  ____________________________________________________________ Date:  ____________________________________________________________

## 2017-04-14 NOTE — PERIOP NOTES
I have reviewed discharge instructions with the patient and caregiver. The patient and caregiver verbalized understanding. IV catheter discontinued intact. Site without signs and symptoms of complications. Dressing and pressure applied. Patient armband removed and shredded.

## 2017-04-14 NOTE — OP NOTES
[address]    Name:  Nicko Castro  MR#:  [id number]  :  1970  Account #:  [de-identified]  Date of Adm:  2017  Date of Surgery:  2017      PREOPERATIVE DIAGNOSIS: Medial and lateral meniscus tear, right  knee. POSTOPERATIVE DIAGNOSES  1. Grade III, almost IV, chondromalacia, medial femoral condyle. 2. Degenerative arthritis. 3. Medial meniscus tear. 4. Lateral meniscus tear, right knee. PROCEDURES PERFORMED  1. Arthroscopy. 2. Arthroscopic chondroplasty, medial femoral condyle. 3. Arthroscopic partial medial meniscectomy. 4. Arthroscopic partial lateral meniscectomy, right knee. SURGEON: Clari Damon MD    ESTIMATED BLOOD LOSS: Minimal.    SPECIMENS REMOVED: None. ANESTHESIA: General.    COMPLICATIONS: None. FINDINGS: As above. BRIEF HISTORY: The patient has had significant amount of problems  with the right knee, no response to conservative treatment, was  consented for surgery after having discussed at length possible risks  and complications of surgery including infection, bleeding, recurrence  of pain, among other possible problems. DESCRIPTION OF PROCEDURE: The patient was taken to the  operating room, induced under general endotracheal anesthesia with  anesthesia staff, placed in a standard arthroscopy webb. Right leg  was prepped with DuraPrep solution and draped as a free sterile field. Anterolateral portal was used as arthroscopy portal, anteromedial  portal was the work portal. Portals were made with an 11 blade  followed by blunt trocars. Once the arthroscope was in place, the knee  was systematically evaluated starting at the suprapatellar pouch,  patellofemoral joint with diffuse degenerative changes. No unstable  flaps. Medial compartment, significant degenerative changes with  grade III chondral changes most of the weightbearing surface, leaving  almost a defect, almost to grade IV changes.  It was debrided to a  stable base using a shaver. The horizontal cleavage tear of the medial  meniscus was found. It was debrided using straight basket forceps and  3.5 shaver, leaving a stable rim. The lateral compartment with some  cleavage tear noted that was debrided using straight basket forceps  and 3.5 shaver, leaving a stable rim. Fluid was suctioned out. The  knee was injected with a mixture of Marcaine. Portals were closed with  4-0 Monocryl. Sterile dressings were applied. The patient tolerated the  procedure well and was taken to recovery room without problems.         Alexandro Kayser, MD EL / Scar Sweeney  D:  04/14/2017   15:50  T:  04/14/2017   16:28  Job #:  [id number]

## 2017-04-14 NOTE — INTERVAL H&P NOTE
H&P Update:  Shubham Meléndez was seen and examined. History and physical has been reviewed. The patient has been examined. There have been no significant clinical changes since the completion of the originally dated History and Physical.  Patient identified by surgeon; surgical site was confirmed by patient and surgeon.     Signed By: RELL Odonnell     April 14, 2017 11:27 AM

## 2017-04-14 NOTE — BRIEF OP NOTE
BRIEF OPERATIVE NOTE    Date of Procedure: 4/14/2017   Preoperative Diagnosis: Acute medial meniscus tear of right knee, initial encounter [S83.241A]  Tear of lateral meniscus of right knee, unspecified tear type, unspecified whether old or current tear, initial encounter [S88.281A]  Postoperative Diagnosis:grade 3 chondromalacia medial femoral condyle, lateral meniscus tear, right knee   Procedure(s):  RIGHT KNEE ARTHROSCOPIC PARTIAL MEDIAL AND LATERAL MENISCECTOMY, chondroplasty medial femoral condyle  Surgeon(s) and Role:     * Faith Paul MD - Primary            Surgical Staff:  Circ-1: Loraine Garcia RN  Scrub Tech-1: Juan Malcolm  Surg Asst-1: Adrián Chandler  No case tracking events are documented in the log.   Anesthesia: General   Estimated Blood Loss: minimal  Specimens: * No specimens in log *   Findings: as above   Complications: none  Implants: * No implants in log *

## 2017-04-14 NOTE — ANESTHESIA PREPROCEDURE EVALUATION
Anesthetic History   No history of anesthetic complications            Review of Systems / Medical History  Patient summary reviewed and pertinent labs reviewed    Pulmonary  Within defined limits                 Neuro/Psych   Within defined limits           Cardiovascular  Within defined limits                Exercise tolerance: >4 METS     GI/Hepatic/Renal  Within defined limits              Endo/Other    Diabetes: well controlled, type 2  Hypothyroidism: well controlled       Other Findings   Comments:   Risk Factors for Postoperative nausea/vomiting:       History of postoperative nausea/vomiting? NO       Female? YES       Motion sickness? NO       Intended opioid administration for postoperative analgesia?   YES         Physical Exam    Airway  Mallampati: II  TM Distance: 4 - 6 cm  Neck ROM: normal range of motion   Mouth opening: Normal     Cardiovascular    Rhythm: regular  Rate: normal         Dental  No notable dental hx       Pulmonary  Breath sounds clear to auscultation               Abdominal  GI exam deferred       Other Findings            Anesthetic Plan    ASA: 3  Anesthesia type: general          Induction: Intravenous  Anesthetic plan and risks discussed with: Patient

## 2017-04-14 NOTE — ANESTHESIA POSTPROCEDURE EVALUATION
Post-Anesthesia Evaluation and Assessment    Patient: Cortez Chaney MRN: [id number]  SSN: [id number]    YOB: 1970  Age: 40 y.o. Sex: female       Cardiovascular Function/Vital Signs  Visit Vitals    /65    Pulse 77    Temp 36.9 °C (98.4 °F)    Resp 20    Ht 5' 2\" (1.575 m)    Wt 114.3 kg (252 lb)    SpO2 95%    BMI 46.09 kg/m2       Patient is status post general anesthesia for Procedure(s):  RIGHT KNEE ARTHROSCOPIC PARTIAL MEDIAL AND LATERAL MENISCECTOMY. Nausea/Vomiting: None    Postoperative hydration reviewed and adequate. Pain:  Pain Scale 1: Numeric (0 - 10) (04/14/17 1643)  Pain Intensity 1: 5 (04/14/17 1643)   Managed    Neurological Status:   Neuro (WDL): Within Defined Limits (04/14/17 1518)   At baseline    Mental Status and Level of Consciousness: Arousable    Pulmonary Status:   O2 Device: Room air (04/14/17 1522)   Adequate oxygenation and airway patent    Complications related to anesthesia: None    Post-anesthesia assessment completed.  No concerns    Signed By: Juan Antonio Silva MD     April 14, 2017

## 2017-04-14 NOTE — IP AVS SNAPSHOT
Current Discharge Medication List  
  
CONTINUE these medications which have NOT CHANGED Dose & Instructions Dispensing Information Comments Morning Noon Evening Bedtime  
 calcium-vitamin D 500 mg(1,250mg) -200 unit per tablet Commonly known as:  OYSTER SHELL Your last dose was: Your next dose is:    
   
   
 Dose:  1 Tab Take 1 Tab by mouth daily. Refills:  0 HYDROcodone-acetaminophen 7.5-325 mg per tablet Commonly known as:  [address] Your last dose was: Your next dose is:    
   
   
 Dose:  1-2 Tab Take 1-2 Tabs by mouth every four (4) hours as needed for Pain. Max Daily Amount: 12 Tabs. Do not take until after surgery Quantity:  40 Tab Refills:  0  
     
   
   
   
  
 ibuprofen 800 mg tablet Commonly known as:  MOTRIN Your last dose was: Your next dose is:    
   
   
 Dose:  800 mg Take 1 Tab by mouth three (3) times daily (with meals). Quantity:  60 Tab Refills:  0  
     
   
   
   
  
 levothyroxine 150 mcg tablet Commonly known as:  SYNTHROID Your last dose was: Your next dose is:    
   
   
 Dose:  150 mcg Take 150 mcg by mouth daily. Refills:  0  
     
   
   
   
  
 metFORMIN 1,000 mg tablet Commonly known as:  GLUCOPHAGE Your last dose was: Your next dose is:    
   
   
 Dose:  1000 mg Take 1,000 mg by mouth daily. Refills:  0  
     
   
   
   
  
 metoprolol tartrate 25 mg tablet Commonly known as:  LOPRESSOR Your last dose was: Your next dose is:    
   
   
 Dose:  25 mg Take 25 mg by mouth daily. Refills:  0 MIRENA 20 mcg/24 hr (5 years) IUD Generic drug:  levonorgestrel Your last dose was: Your next dose is:    
   
   
 Dose:  1 Each  
1 Each by IntraUTERine route once. Refills:  0

## 2017-04-21 ENCOUNTER — TELEPHONE (OUTPATIENT)
Dept: ORTHOPEDIC SURGERY | Age: 47
End: 2017-04-21

## 2017-04-21 NOTE — TELEPHONE ENCOUNTER
Patient said she is still experiencing the \"slipping\" feeling she had before her surgery. I told her that her knee has not healed yet although it has been repaired. She also was concerned about her steri strips and I told her we would be changing them at her visit on Monday to just leave them in place for now.

## 2017-04-21 NOTE — TELEPHONE ENCOUNTER
Pt called in states that she would like to speak with a nurse regarding questions she has about her [address] she had on 04/14/17 with Dr. Megan Diego. Pt can be reached at  [phone].

## 2017-04-24 ENCOUNTER — OFFICE VISIT (OUTPATIENT)
Dept: ORTHOPEDIC SURGERY | Age: 47
End: 2017-04-24

## 2017-04-24 VITALS
HEIGHT: 62 IN | BODY MASS INDEX: 46.19 KG/M2 | HEART RATE: 104 BPM | SYSTOLIC BLOOD PRESSURE: 129 MMHG | WEIGHT: 251 LBS | TEMPERATURE: 97.6 F | DIASTOLIC BLOOD PRESSURE: 74 MMHG

## 2017-04-24 DIAGNOSIS — S83.241D ACUTE MEDIAL MENISCUS TEAR, RIGHT, SUBSEQUENT ENCOUNTER: Primary | ICD-10-CM

## 2017-04-24 DIAGNOSIS — S83.281D ACUTE LATERAL MENISCUS TEAR OF RIGHT KNEE, SUBSEQUENT ENCOUNTER: ICD-10-CM

## 2017-04-24 DIAGNOSIS — Z98.890 POST-OPERATIVE STATE: ICD-10-CM

## 2017-04-24 DIAGNOSIS — S83.281A ACUTE LATERAL MENISCUS TEAR OF RIGHT KNEE, INITIAL ENCOUNTER: ICD-10-CM

## 2017-04-24 DIAGNOSIS — S83.241A ACUTE MEDIAL MENISCUS TEAR, RIGHT, INITIAL ENCOUNTER: ICD-10-CM

## 2017-04-24 RX ORDER — DICLOFENAC SODIUM AND MISOPROSTOL 75; 200 MG/1; UG/1
1 TABLET, DELAYED RELEASE ORAL 2 TIMES DAILY
Qty: 60 TAB | Refills: 0 | Status: SHIPPED | OUTPATIENT
Start: 2017-04-24 | End: 2020-05-29

## 2017-04-24 NOTE — MR AVS SNAPSHOT
Visit Information Date & Time Provider Department Dept. Phone Encounter #  
 4/24/2017 10:45 AM Saige Tolentino [facility] [phone] Follow-up Instructions Return in about 2 weeks (around 5/8/2017) for follow up evaluation. Upcoming Health Maintenance Date Due DTaP/Tdap/Td series (1 - Tdap) 4/18/1992 PAP AKA CERVICAL CYTOLOGY 4/18/1992 INFLUENZA AGE 9 TO ADULT 8/1/2016 Allergies as of 4/24/2017  Review Complete On: 4/24/2017 By: Minnie Pierre No Known Allergies Current Immunizations  Never Reviewed No immunizations on file. Not reviewed this visit You Were Diagnosed With   
  
 Codes Comments Acute medial meniscus tear, right, subsequent encounter    -  Primary ICD-10-CM: X55.115I ICD-9-CM: V58.89, 836.0 Acute lateral meniscus tear of right knee, subsequent encounter     ICD-10-CM: R99.466N ICD-9-CM: V58.89, 836.1 Acute medial meniscus tear, right, initial encounter     ICD-10-CM: D27.182J ICD-9-CM: 836.0 Acute lateral meniscus tear of right knee, initial encounter     ICD-10-CM: S90.399A ICD-9-CM: 836.1 Post-operative state     ICD-10-CM: V83.102 ICD-9-CM: V45.89 Vitals BP Pulse Temp Height(growth percentile) Weight(growth percentile) LMP  
 129/74 (!) 104 97.6 °F (36.4 °C) (Oral) 5' 2\" (1.575 m) 251 lb (113.9 kg) 02/22/2017 BMI OB Status Smoking Status 45.91 kg/m2 IUD Never Smoker BMI and BSA Data Body Mass Index Body Surface Area 45.91 kg/m 2 2.23 m 2 Your Updated Medication List  
  
   
This list is accurate as of: 4/24/17 11:17 AM.  Always use your most recent med list.  
  
  
  
  
 calcium-vitamin D 500 mg(1,250mg) -200 unit per tablet Commonly known as:  OYSTER SHELL Take 1 Tab by mouth daily. HYDROcodone-acetaminophen 7.5-325 mg per tablet Commonly known as:  Darlinezachang Durbinu  
 Take 1-2 Tabs by mouth every four (4) hours as needed for Pain. Max Daily Amount: 12 Tabs. Do not take until after surgery  
  
 ibuprofen 800 mg tablet Commonly known as:  MOTRIN Take 1 Tab by mouth three (3) times daily (with meals). levothyroxine 150 mcg tablet Commonly known as:  SYNTHROID Take 150 mcg by mouth daily. metFORMIN 1,000 mg tablet Commonly known as:  GLUCOPHAGE Take 1,000 mg by mouth daily. metoprolol tartrate 25 mg tablet Commonly known as:  LOPRESSOR Take 25 mg by mouth daily. MIRENA 20 mcg/24 hr (5 years) IUD Generic drug:  levonorgestrel 1 Each by IntraUTERine route once. Follow-up Instructions Return in about 2 weeks (around 5/8/2017) for follow up evaluation. Patient Instructions Follow up in 2 weeks with Dr. Gera Stevenson Meniscus Surgery: What to Expect at [facility] Your Recovery Meniscus surgery removes or fixes the cartilage (meniscus) between the bones in the knee. Each knee has two of these rubbery pads of cartilage, one on either side. Meniscus repair is usually done with arthroscopic surgery. Your doctor put a lighted tubecalled an arthroscope or scopeand other surgical tools through small cuts (incisions) in your knee. The incisions leave scars that usually fade in time. You will feel tired for several days. Your knee will be swollen, and you may have numbness around the cuts the doctor made (incisions) on your knee. You can put ice on the knee to reduce swelling. Most of this will go away in a few days. You should soon start seeing improvement in your knee. You may be able to return to most of your regular activities within a few weeks. But it will be several months before you have complete use of your knee. It may take as long as 6 months before your knee is strong enough for hard physical work or certain sports.  You will need to build your strength and the motion of your joint with rehabilitation (rehab) exercises. In time, your knee will likely be stronger and more stable than it was before the surgery. How soon you can return to sports or exercise depends on how well you follow your rehab program and how well your knee heals. Your doctor or physical therapist will give you an idea of when you can return to these activities. If you had a partial meniscectomy, you might be able to play sports in about 4 to 6 weeks. If you had meniscus repair, it may be 3 to 6 months before you can play sports. This care sheet gives you a general idea about how long it will take for you to recover. But each person recovers at a different pace. Follow the steps below to get better as quickly as possible. How can you care for yourself at home? Activity · Rest when you feel tired. Getting enough sleep will help you recover. Sleep with your knee raised, but not bent. Put a pillow under your foot. · Keep your leg raised as much as possible for the first few days. · You may shower 24 to 48 hours after surgery, if your doctor okays it. When you shower, keep your bandage and incisions dry by taping a sheet of plastic to cover them. If you have a brace, take it off if your doctor says it is okay. It might help to sit on a shower stool. · You will be able to stand if you have a brace or use crutches. Do not put weight on your leg until your doctor says you can. You can move around the house to do daily tasks. · If you have a brace, leave it on except when you exercise your knee or you shower. Be careful not to put the brace on too tight. You will use it for about 2 to 6 weeks. · If your doctor does not want you to shower or remove your brace, you can take a sponge bath. · Wait 2 weeks or until your doctor says it is okay before you take a bath, swim, use a hot tub, or soak your leg.  
· You can drive when you are no longer using crutches or a knee brace, are no longer taking prescription pain medicine, and have some control over your knee. This usually takes 1 to 2 weeks. · How soon you can return to work depends on your job. If you sit at work, you may be able to go back in 1 to 2 weeks. But if you are on your feet at work, it may take 4 to 6 weeks. If you are very physically active in your job, it may take 3 to 6 months. Diet · You can eat your normal diet. If your stomach is upset, try bland, low-fat foods like plain rice, broiled chicken, toast, and yogurt. Drink plenty of fluids. · You may notice that your bowel movements are not regular right after your surgery. This is common. Try to avoid constipation and straining with bowel movements. You may want to take a fiber supplement every day. If you have not had a bowel movement after a couple of days, ask your doctor about taking a mild laxative. Medicines · Your doctor will tell you if and when you can restart your medicines. He or she will also give you instructions about taking any new medicines. · If you take blood thinners, such as warfarin (Coumadin), clopidogrel (Plavix), or aspirin, be sure to talk to your doctor. He or she will tell you if and when to start taking those medicines again. Make sure that you understand exactly what your doctor wants you to do. · Be safe with medicines. Take pain medicines exactly as directed. ¨ If the doctor gave you a prescription medicine for pain, take it as prescribed. ¨ If you are not taking a prescription pain medicine, ask your doctor if you can take an over-the-counter medicine. · If your doctor prescribed antibiotics, take them as directed. Do not stop taking them just because you feel better. You need to take the full course of antibiotics. · If you think your pain medicine is making you sick to your stomach: 
¨ Take your medicine after meals (unless your doctor has told you not to). ¨ Ask your doctor for a different pain medicine. Incision care · If you have a bandage over your incisions, keep the bandage clean and dry. Follow your doctor's instructions. Some doctors want to see you before you take it off, while others may let you take it off 48 to 72 hours after your surgery. · If you have strips of tape on the incisions, leave the tape on for a week or until it falls off. Keep the area clean and dry. Exercise · Rehab exercises are an important part of your treatment. Your first exercises will help you improve your knee's movement and regain your muscle strength. Ice and elevation · To reduce swelling and pain, put ice or a cold pack on your knee for 10 to 20 minutes at a time. Do this every few hours. Put a thin cloth between the ice and your skin. · For 3 days after surgery, prop up the sore leg on a pillow when you ice it or anytime you sit or lie down. Try to keep it above the level of your heart. This will help reduce swelling. · If your doctor gave you support stockings, wear them as long as he or she tells you to. These help prevent blood clots. Follow-up care is a key part of your treatment and safety. Be sure to make and go to all appointments, and call your doctor if you are having problems. It's also a good idea to know your test results and keep a list of the medicines you take. When should you call for help? Call 911 anytime you think you may need emergency care. For example, call if: 
· You passed out (lost consciousness). · You have severe trouble breathing. · You have sudden chest pain and shortness of breath, or you cough up blood. Call your doctor now or seek immediate medical care if: 
· You have pain that does not go away after you take pain medicine. · You have loose stitches, or your incisions come open. · Bright red blood has soaked through the bandage over your incision. · You have signs of infection, such as: 
¨ Increased pain, swelling, warmth, or redness. ¨ Red streaks leading from the incision. ¨ Pus draining from the incision. ¨ Swollen lymph nodes in your neck, armpits, or groin. ¨ A fever. · You have signs of a blood clot, such as: 
¨ Pain in your calf, back of the knee, thigh, or groin. ¨ Redness and swelling in your leg or groin. Watch closely for any changes in your health, and be sure to contact your doctor if: 
· You feel a catching or locking in your knee. · You are sick to your stomach or cannot keep fluids down. · You have swelling, tingling, pain, or numbness in your toes that does not go away when you raise your knee above the level of your heart. · You do not have a bowel movement after taking a laxative. Where can you learn more? Go to http://jeriyln-erin.info/. Enter [id number] in the search box to learn more about \"Meniscus Surgery: What to Expect at Home. \" Current as of: May 23, 2016 Content Version: 11.2 © 5080-0238 [employer]. Care instructions adapted under license by [employer] (which disclaims liability or warranty for this information). If you have questions about a medical condition or this instruction, always ask your healthcare professional. [place] [address] any warranty or liability for your use of this information. Introducing [facility]! Frannie Gil introduces [employer] patient portal. Now you can access parts of your medical record, email your doctor's office, and request medication refills online. 1. In your internet browser, go to [email or web address] 2. Click on the First Time User? Click Here link in the Sign In box. You will see the New Member Sign Up page. 3. Enter your [employer] Access Code exactly as it appears below. You will not need to use this code after youve completed the sign-up process. If you do not sign up before the expiration date, you must request a new code. · [employer] Access Code: [email or web address] Expires: 6/13/2017  1:04 PM 
 
 4. Enter the last four digits of your Social Security Number (xxxx) and Date of Birth (mm/dd/yyyy) as indicated and click Submit. You will be taken to the next sign-up page. 5. Create a tibdit ID. This will be your tibdit login ID and cannot be changed, so think of one that is secure and easy to remember. 6. Create a tibdit password. You can change your password at any time. 7. Enter your Password Reset Question and Answer. This can be used at a later time if you forget your password. 8. Enter your e-mail address. You will receive e-mail notification when new information is available in [address] 9. Click Sign Up. You can now view and download portions of your medical record. 10. Click the Download Summary menu link to download a portable copy of your medical information. If you have questions, please visit the Frequently Asked Questions section of the tibdit website. Remember, tibdit is NOT to be used for urgent needs. For medical emergencies, dial 911. Now available from your iPhone and Android! Please provide this summary of care documentation to your next provider. Your primary care clinician is listed as Will Kevin. If you have any questions after today's visit, please call [phone].

## 2017-04-24 NOTE — PATIENT INSTRUCTIONS
Follow up in 2 weeks with Dr. Azam Sutton         Meniscus Surgery: What to Expect at [facility]  Meniscus surgery removes or fixes the cartilage (meniscus) between the bones in the knee. Each knee has two of these rubbery pads of cartilage, one on either side. Meniscus repair is usually done with arthroscopic surgery. Your doctor put a lighted tube--called an arthroscope or scope--and other surgical tools through small cuts (incisions) in your knee. The incisions leave scars that usually fade in time. You will feel tired for several days. Your knee will be swollen, and you may have numbness around the cuts the doctor made (incisions) on your knee. You can put ice on the knee to reduce swelling. Most of this will go away in a few days. You should soon start seeing improvement in your knee. You may be able to return to most of your regular activities within a few weeks. But it will be several months before you have complete use of your knee. It may take as long as 6 months before your knee is strong enough for hard physical work or certain sports. You will need to build your strength and the motion of your joint with rehabilitation (rehab) exercises. In time, your knee will likely be stronger and more stable than it was before the surgery. How soon you can return to sports or exercise depends on how well you follow your rehab program and how well your knee heals. Your doctor or physical therapist will give you an idea of when you can return to these activities. If you had a partial meniscectomy, you might be able to play sports in about 4 to 6 weeks. If you had meniscus repair, it may be 3 to 6 months before you can play sports. This care sheet gives you a general idea about how long it will take for you to recover. But each person recovers at a different pace. Follow the steps below to get better as quickly as possible. How can you care for yourself at home? Activity  · Rest when you feel tired. Getting enough sleep will help you recover. Sleep with your knee raised, but not bent. Put a pillow under your foot. · Keep your leg raised as much as possible for the first few days. · You may shower 24 to 48 hours after surgery, if your doctor okays it. When you shower, keep your bandage and incisions dry by taping a sheet of plastic to cover them. If you have a brace, take it off if your doctor says it is okay. It might help to sit on a shower stool. · You will be able to stand if you have a brace or use crutches. Do not put weight on your leg until your doctor says you can. You can move around the house to do daily tasks. · If you have a brace, leave it on except when you exercise your knee or you shower. Be careful not to put the brace on too tight. You will use it for about 2 to 6 weeks. · If your doctor does not want you to shower or remove your brace, you can take a sponge bath. · Wait 2 weeks or until your doctor says it is okay before you take a bath, swim, use a hot tub, or soak your leg. · You can drive when you are no longer using crutches or a knee brace, are no longer taking prescription pain medicine, and have some control over your knee. This usually takes 1 to 2 weeks. · How soon you can return to work depends on your job. If you sit at work, you may be able to go back in 1 to 2 weeks. But if you are on your feet at work, it may take 4 to 6 weeks. If you are very physically active in your job, it may take 3 to 6 months. Diet  · You can eat your normal diet. If your stomach is upset, try bland, low-fat foods like plain rice, broiled chicken, toast, and yogurt. Drink plenty of fluids. · You may notice that your bowel movements are not regular right after your surgery. This is common. Try to avoid constipation and straining with bowel movements. You may want to take a fiber supplement every day.  If you have not had a bowel movement after a couple of days, ask your doctor about taking a mild laxative. Medicines  · Your doctor will tell you if and when you can restart your medicines. He or she will also give you instructions about taking any new medicines. · If you take blood thinners, such as warfarin (Coumadin), clopidogrel (Plavix), or aspirin, be sure to talk to your doctor. He or she will tell you if and when to start taking those medicines again. Make sure that you understand exactly what your doctor wants you to do. · Be safe with medicines. Take pain medicines exactly as directed. ¨ If the doctor gave you a prescription medicine for pain, take it as prescribed. ¨ If you are not taking a prescription pain medicine, ask your doctor if you can take an over-the-counter medicine. · If your doctor prescribed antibiotics, take them as directed. Do not stop taking them just because you feel better. You need to take the full course of antibiotics. · If you think your pain medicine is making you sick to your stomach:  ¨ Take your medicine after meals (unless your doctor has told you not to). ¨ Ask your doctor for a different pain medicine. Incision care  · If you have a bandage over your incisions, keep the bandage clean and dry. Follow your doctor's instructions. Some doctors want to see you before you take it off, while others may let you take it off 48 to 72 hours after your surgery. · If you have strips of tape on the incisions, leave the tape on for a week or until it falls off. Keep the area clean and dry. Exercise  · Rehab exercises are an important part of your treatment. Your first exercises will help you improve your knee's movement and regain your muscle strength. Ice and elevation  · To reduce swelling and pain, put ice or a cold pack on your knee for 10 to 20 minutes at a time. Do this every few hours. Put a thin cloth between the ice and your skin. · For 3 days after surgery, prop up the sore leg on a pillow when you ice it or anytime you sit or lie down.  Try to keep it above the level of your heart. This will help reduce swelling. · If your doctor gave you support stockings, wear them as long as he or she tells you to. These help prevent blood clots. Follow-up care is a key part of your treatment and safety. Be sure to make and go to all appointments, and call your doctor if you are having problems. It's also a good idea to know your test results and keep a list of the medicines you take. When should you call for help? Call 911 anytime you think you may need emergency care. For example, call if:  · You passed out (lost consciousness). · You have severe trouble breathing. · You have sudden chest pain and shortness of breath, or you cough up blood. Call your doctor now or seek immediate medical care if:  · You have pain that does not go away after you take pain medicine. · You have loose stitches, or your incisions come open. · Bright red blood has soaked through the bandage over your incision. · You have signs of infection, such as:  ¨ Increased pain, swelling, warmth, or redness. ¨ Red streaks leading from the incision. ¨ Pus draining from the incision. ¨ Swollen lymph nodes in your neck, armpits, or groin. ¨ A fever. · You have signs of a blood clot, such as:  ¨ Pain in your calf, back of the knee, thigh, or groin. ¨ Redness and swelling in your leg or groin. Watch closely for any changes in your health, and be sure to contact your doctor if:  · You feel a catching or locking in your knee. · You are sick to your stomach or cannot keep fluids down. · You have swelling, tingling, pain, or numbness in your toes that does not go away when you raise your knee above the level of your heart. · You do not have a bowel movement after taking a laxative. Where can you learn more? Go to http://jerilyn-erin.info/. Enter [id number] in the search box to learn more about \"Meniscus Surgery: What to Expect at Home. \"  Current as of: May 23, 2016  Content Version: 11.2  © 5606-9473 Healthwise[employer]. Care instructions adapted under license by [employer] (which disclaims liability or warranty for this information). If you have questions about a medical condition or this instruction, always ask your healthcare professional. [place] [address] any warranty or liability for your use of this information.

## 2017-04-24 NOTE — PROGRESS NOTES
Patient: Lazara Del Angel                MRN: [id number]       SSN: [id number]  YOB: 1970             AGE: 56 y.o. SEX: female    Garrett Gamboa MD    POST OP OFFICE NOTE  DOS: 4/14/17    Chief Complaint:   Chief Complaint   Patient presents with    Knee Pain     Right, sx 4-14-17       HPI:     The patient is a 56 y.o. female who presents today for follow up 10 days s/p:      1. Arthroscopy. 2. Arthroscopic chondroplasty, medial femoral condyle. 3. Arthroscopic partial medial meniscectomy. 4. Arthroscopic partial lateral meniscectomy, right knee.     Patient has been PWB to the right lower extremity using a walker. Patient reports doing well other than post op pain and feels like some slipping of her knee. She states that she was taking Norco for pain, but discontinued taking the medication as the Lear Deems was making her dizzy. She has been taking Ibuprofen which is working well, but causes stomach upset. She is a  and needs a work note. Patient denies any fever, chills, chest pain, shortness of breath or calf pain. There are no new illness or injuries to report since last seen in the office. PHYSICAL EXAM:     Visit Vitals    /74    Pulse (!) 104    Temp 97.6 °F (36.4 °C) (Oral)    Ht 5' 2\" (1.575 m)    Wt 251 lb (113.9 kg)    LMP 02/22/2017    BMI 45.91 kg/m2       GEN:  Alert, well developed, well nourished, well appearing 56 y.o. female in no acute distress. PSYCH:  Normal affect, mood, and conduct. alert, oriented x 3 alert, oriented x 3, no dementia  M/S EXAMINATION OF: right knee  DRESSINGS: CDI  DRAINAGE: none  INCISION: Incision looks good, skin well approximated, no dehiscence. Portals look good  SKIN: mild edema , no erythema, no  ecchymosis, no warmth    TENDERNESS:  mild tenderness to palpation (as expected after surgery)  NEUROVASCULAR:  grossly intact. Positive distal pulses and capillary refill.    DVT ASSESSMENT:  The calf is not tender to palpation. No evidence of DVT seen on physical exam.  ROM:  limted 15-90     RADIOGRAPHS & DIAGNOSTIC STUDIES     No results found for any visits on 04/24/17. IMPRESSION:     Encounter Diagnoses     ICD-10-CM ICD-9-CM   1. Acute medial meniscus tear, right, subsequent encounter S83.241D V58.89     836.0   2. Acute lateral meniscus tear of right knee, subsequent encounter S83.281D V58.89     836.1   3. Acute medial meniscus tear, right, initial encounter S83.241A 836.0   4. Acute lateral meniscus tear of right knee, initial encounter S83.281A 836.1   5. Post-operative state Z98.890 V45.89       PLAN:         Orders Placed This Encounter    REFERRAL TO PHYSICAL THERAPY    diclofenac-miSOPROStol (ARTHROTEC 75)  mg-mcg per tablet    Follow up in 2 weeks with Dr. Ariadna Rodriges per Dr. Arthur Darden provided for PT - per Dr. Kristina Iyer:     Otherwise as noted in HPI     REVIEW OF SYSTEMS: All Below are Negative except: See HPI     PAST MEDICAL HISTORY:     Past Medical History:   Diagnosis Date    Diabetes ([facility] [address]     Graves disease     Tachycardia     due to Graves Disease, on Metoprolol       MEDICATIONS:     Current Outpatient Prescriptions   Medication Sig    diclofenac-miSOPROStol (ARTHROTEC 75)  mg-mcg per tablet Take 1 Tab by mouth two (2) times a day.  HYDROcodone-acetaminophen (NORCO) 7.5-325 mg per tablet Take 1-2 Tabs by mouth every four (4) hours as needed for Pain. Max Daily Amount: 12 Tabs. Do not take until after surgery    levonorgestrel (MIRENA) 20 mcg/24 hr (5 years) IUD 1 Each by IntraUTERine route once.  levothyroxine (SYNTHROID) 150 mcg tablet Take 150 mcg by mouth daily.  metoprolol tartrate (LOPRESSOR) 25 mg tablet Take 25 mg by mouth daily.  metFORMIN (GLUCOPHAGE) 1,000 mg tablet Take 1,000 mg by mouth daily.     calcium-vitamin D (OYSTER SHELL) 500 mg(1,250mg) -200 unit per tablet Take 1 Tab by mouth daily. No current facility-administered medications for this visit. ALLERGIES:     No Known Allergies      PAST SURGICAL HISTORY:     Past Surgical History:   Procedure Laterality Date    HX THYROIDECTOMY  05/13/2016       SOCIAL HISTORY:     Social History     Social History    Marital status: SINGLE     Spouse name: N/A    Number of children: N/A    Years of education: N/A     Occupational History    Not on file. Social History Main Topics    Smoking status: Never Smoker    Smokeless tobacco: Never Used    Alcohol use No    Drug use: No    Sexual activity: Not on file     Other Topics Concern    Not on file     Social History Narrative       FAMILY HISTORY:     History reviewed. No pertinent family history.         Bridger Vargas PA-C  4/24/2017

## 2017-04-24 NOTE — LETTER
NOTIFICATION RETURN TO WORK / SCHOOL 
 
4/24/2017 11:33 AM 
 
Ms. Chantell Soler [id number] [place] [id number] To Whom It May Concern: 
 
Chantell Soler is currently under the care of [address] [place] [address] Kailash Greene. She will remain out of work until 5-9-17. If there are questions or concerns please have the patient contact our office. Sincerely, Avila Nava PA-C

## 2017-04-25 ENCOUNTER — APPOINTMENT (OUTPATIENT)
Dept: PHYSICAL THERAPY | Age: 47
End: 2017-04-25

## 2017-04-27 ENCOUNTER — TELEPHONE (OUTPATIENT)
Dept: ORTHOPEDIC SURGERY | Age: 47
End: 2017-04-27

## 2017-04-27 ENCOUNTER — APPOINTMENT (OUTPATIENT)
Dept: PHYSICAL THERAPY | Age: 47
End: 2017-04-27

## 2017-04-27 NOTE — TELEPHONE ENCOUNTER
Case Id: [id number]  Product Name: Arthrotec 75 mg   Status: Approved  Coverage Start Date: 04/27/2017  Coverage End Date: 04/27/2018

## 2017-04-28 ENCOUNTER — TELEPHONE (OUTPATIENT)
Dept: ORTHOPEDIC SURGERY | Age: 47
End: 2017-04-28

## 2017-04-28 NOTE — TELEPHONE ENCOUNTER
CRYSTAL FROM [facility] CALLED FOR RELL RIVERA.    CHRISTOPHER SAID THEY NEED PRIOR AUTHORIZATION FOR THE ARTHROTEC MEDICATION FOR THE PATIENT. CHRISTOPHER SAID SHE IS FAXING OVER THE FORM TO THE [facility] LOCATION TODAY 04/28/2017 FOR THE PRIOR AUTH FOR THE ARTHROTEC MEDICATION. [id number]   TEL. [phone].

## 2017-05-02 ENCOUNTER — HOSPITAL ENCOUNTER (OUTPATIENT)
Dept: PHYSICAL THERAPY | Age: 47
Discharge: HOME | End: 2017-05-02
Payer: MEDICAID

## 2017-05-02 PROCEDURE — 97161 PT EVAL LOW COMPLEX 20 MIN: CPT

## 2017-05-02 PROCEDURE — 97110 THERAPEUTIC EXERCISES: CPT

## 2017-05-02 PROCEDURE — 97140 MANUAL THERAPY 1/> REGIONS: CPT

## 2017-05-02 NOTE — PROGRESS NOTES
[facility]              [address]        [place], [address]   [phone] fax    Plan of Care/ Statement of Necessity for Physical Therapy Services  Patient name: Irma Garcia Start of Care: 2017   Referral source: Elie Najjar : 1970    Medical Diagnosis: Right knee pain [M25.561]  Other tear of medial meniscus, current injury, right knee, initial encounter [S83.241A]  Other tear of lateral meniscus, current injury, right knee, initial encounter [S83.281A]   Onset Date: DOS: 2017    Treatment Diagnosis: s/p R knee scope   Prior Hospitalization: see medical history Provider#: [id number]   Medications: Verified on Patient summary List    Comorbidities: Arthritis, BMI over 30, DM, GI Disease, HA's, Previous accidents, Prior sx   Prior Level of Function: 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 The Plan of Care and following information is based on the information from the initial evaluation. Assessment/ key information: Pt is a 57 y/o female who presents s/p R knee scope on 2017. Pt reports she has had chronic knee pain for years due to being on her feet a lot at work, around Oct is when it got to the point where ibuprofen was not helping and the pain was getting unbearable, in March she slipped on ice and fell on R knee causing even more pain and swelling. After fall she was referred to ortho, MRI revealed torn mensicus and sx was scheduled. Pt denies any post op complications. Pt reports she is still having difficulty getting around w/o AD and has increased pain at night when she is trying to sleep. Pt ambulates into clinic with two axillary crutches and antalgic gait on R. Pt's incisions are CDI. Pt's R knee AROM: 10-85 deg with pain at end ranges. Pt's R LE MMT: hip flex 3+/5, hip abd 3+/5, knee ext 3-/5, knee flex 3+/5.  Pt presents with poor quad set and extensor lag/mm fatigue with SLR. Patient will benefit from skilled PT services to modify and progress therapeutic interventions, address functional mobility deficits, address ROM deficits, address strength deficits, analyze and address soft tissue restrictions, analyze and cue movement patterns, address imbalance/dizziness and instruct in home and community integration to attain remaining goals. Evaluation Complexity History MEDIUM  Complexity : 1-2 comorbidities / personal factors will impact the outcome/ POC ; Examination MEDIUM Complexity : 3 Standardized tests and measures addressing body structure, function, activity limitation and / or participation in recreation  ;Presentation LOW Complexity : Stable, uncomplicated  ;Clinical Decision Making MEDIUM Complexity : FOTO score of 26-74  Overall Complexity Rating: LOW   Problem List: pain affecting function, decrease ROM, decrease strength, edema affecting function, impaired gait/ balance, decrease ADL/ functional abilitiies, decrease activity tolerance and decrease flexibility/ joint mobility   Treatment Plan may include any combination of the following: Therapeutic exercise, Therapeutic activities, Neuromuscular re-education, Physical agent/modality, Gait/balance training, Manual therapy and Patient education  Patient / Family readiness to learn indicated by: asking questions, trying to perform skills and interest  Persons(s) to be included in education: patient (P)  Barriers to Learning/Limitations: None  Patient Goal (s): Walk w/o AD or limp  Patient Self Reported Health Status: good  Rehabilitation Potential: good    Short Term Goals: To be accomplished in 2 weeks:  1) Pt will report (I) and compliance with HEP for home management of symptoms. 2) Pt will improve R knee AROM 5-90 deg for ease with ambulation. Long Term Goals: To be accomplished in 6 weeks:  1) Pt's FOTO score will improve > or = 53 Indicating improvements in function.   2) Pt will improve R knee AROM 0-115 deg for ease with ambulation. 3) Pt will improve R LE MMT > or = 4/5 for functional strength during ambulation. 4) Pt will demo good quad set indicating good quad strength during ambulation. 5) Pt will ambulate 500' with LRAD and normal gait for safe return to work duties. Frequency / Duration: Patient to be seen 3 times per week for 6 weeks. Patient/ Caregiver education and instruction: Diagnosis, prognosis, exercises   [x]  Plan of care has been reviewed with GREGORY Park PT 5/2/2017 10:57 AM  ________________________________________________________________________    I certify that the above Therapy Services are being furnished while the patient is under my care. I agree with the treatment plan and certify that this therapy is necessary.     [address] [facility] Signature:____________________  Date:____________Time: _________    Please sign and return to [facility] [address], [address] [place]   [phone] fax

## 2017-05-02 NOTE — PROGRESS NOTES
PT DAILY TREATMENT NOTE     Patient Name: Lazara Del Angel  Date:2017  : 1970  [x]  Patient  Verified  Payor: BLUE CROSS MEDICAID / Plan: [facility] [employer] HE[employer]KEEPERS PLUS / Product Type: Managed Care Medicaid /    In time:10:04  Out time:10:50  Total Treatment Time (min): 55  Visit #: 1 of 18    Treatment Area: Right knee pain [M25.561]  Other tear of medial meniscus, current injury, right knee, initial encounter [F62.526Y]  Other tear of lateral meniscus, current injury, right knee, initial encounter [S83.281A]    SUBJECTIVE  Pain Level (0-10 scale): 4  Any medication changes, allergies to medications, adverse drug reactions, diagnosis change, or new procedure performed?: [x] No    [] Yes (see summary sheet for update)  Subjective functional status/changes:   [] No changes reported  Pt is a 55 y/o female who presents s/p R knee scope on 2017. Pt reports she has had chronic knee pain for years due to being on her feet a lot at work, around Oct is when it got to the point where ibuprofen was not helping and the pain was getting unbearable, in March she slipped on ice and fell on R knee causing even more pain and swelling. After fall she was referred to ortho, MRI revealed torn mensicus and sx was scheduled. Pt denies any post op complications. Pt reports she is still having difficulty getting around w/o AD and has increased pain at night when she is trying to sleep.      OBJECTIVE    Modality rationale: decrease inflammation and decrease pain to improve the patients ability to increase ease with ambulation   Min Type Additional Details    [] Estim:  []Unatt       []IFC  []Premod                        []Other:  []w/ice   []w/heat  Position:  Location:    [] Estim: []Att    []TENS instruct  []NMES                    []Other:  []w/US   []w/ice   []w/heat  Position:  Location:    []  Traction: [] Cervical       []Lumbar                       [] Prone          []Supine []Intermittent   []Continuous Lbs:  [] before manual  [] after manual    []  Ultrasound: []Continuous   [] Pulsed                           []1MHz   []3MHz W/cm2:  Location:    []  Iontophoresis with dexamethasone         Location: [] Take home patch   [] In clinic   10 [x]  Ice     []  heat  []  Ice massage  []  Laser   []  Anodyne Position: supine  Location: R knee    []  Laser with stim  []  Other:  Position:  Location:    []  Vasopneumatic Device Pressure:       [] lo [] med [] hi   Temperature: [] lo [] med [] hi   [] Skin assessment post-treatment:  []intact []redness- no adverse reaction    []redness  adverse reaction:     20 min [x]Eval                  []Re-Eval       8 min Therapeutic Exercise:  [x] See flow sheet : Instructed, demonstrated, and performed HEP   Rationale: increase ROM, increase strength and improve coordination to improve the patients ability to decrease pain and improve activity tolerance      min Therapeutic Activity:  []  See flow sheet :   Rationale:   to improve the patients ability to       min Neuromuscular Re-education:  []  See flow sheet :   Rationale:   to improve the patients ability to     8 min Manual Therapy:  PROM flex/ext with OP. Knee flex/ext mobs. Patellar mobs   Rationale: decrease pain, increase ROM, increase tissue extensibility and decrease trigger points to increase ease with ambulation     min Gait Training:  ___ feet with ___ device on level surfaces with ___ level of assist   Rationale: With   [] TE   [] TA   [] neuro   [] other: Patient Education: [x] Review HEP    [] Progressed/Changed HEP based on:   [] positioning   [] body mechanics   [] transfers   [] heat/ice application    [] other:      Other Objective/Functional Measures: Pt ambulates into clinic with two axillary crutches and antalgic gait on R. Pt's incisions are CDI. Pt's R knee AROM: 10-85 deg with pain at end ranges.  Pt's R LE MMT: hip flex 3+/5, hip abd 3+/5, knee ext 3-/5, knee flex 3+/5. Pt presents with poor quad set and extensor lag/mm fatigue with SLR. Pain Level (0-10 scale) post treatment: 3    ASSESSMENT/Changes in Function: see POC    Patient will continue to benefit from skilled PT services to modify and progress therapeutic interventions, address functional mobility deficits, address ROM deficits, address strength deficits, analyze and address soft tissue restrictions, analyze and cue movement patterns, address imbalance/dizziness and instruct in home and community integration to attain remaining goals. [x]  See Plan of Care  []  See progress note/recertification  []  See Discharge Summary         Progress towards goals / Updated goals:  Short term goals: to be accomplished in 2 weeks  1) Pt will report (I) and compliance with HEP for home management of symptoms. At eval: Instructed, demonstrated, and performed HEP  2) Pt will improve R knee AROM 5-90 deg for ease with ambulation. At eval: 10-85 deg  Long term goals: to be accomplished in 6 weeks  1) Pt's FOTO score will improve > or = 53 Indicating improvements in function. At eval: FOTO = 30  2) Pt will improve R knee AROM 0-115 deg for ease with ambulation. At eval: 10-85 deg  3) Pt will improve R LE MMT > or = 4/5 for functional strength during ambulation. At eval: hip flex 3+/5, hip abd 3+/5, knee ext 3-/5, knee flex 3+/5  4) Pt will demo good quad set indicating good quad strength during ambulation. At eval: pt presents with poor quad set  5) Pt will ambulate 500' with LRAD and normal gait for safe return to work duties.   At eval: pt ambulates with 2 axillary crutches and antalgic gait on R     PLAN  []  Upgrade activities as tolerated     []  Continue plan of care  []  Update interventions per flow sheet       []  Discharge due to:_  [x]  Other: 3x/week for 6 weeks      Heriberto Bird PT 5/2/2017  10:57 AM    Future Appointments  Date Time Provider Bernabe Garcia   5/9/2017 10:15 AM Mauro Snellen, PA-C [facility] [address]

## 2017-05-04 ENCOUNTER — TELEPHONE (OUTPATIENT)
Dept: ORTHOPEDIC SURGERY | Age: 47
End: 2017-05-04

## 2017-05-04 ENCOUNTER — HOSPITAL ENCOUNTER (OUTPATIENT)
Dept: PHYSICAL THERAPY | Age: 47
Discharge: HOME | End: 2017-05-04
Payer: MEDICAID

## 2017-05-04 PROCEDURE — 97016 VASOPNEUMATIC DEVICE THERAPY: CPT | Performed by: PHYSICAL THERAPIST

## 2017-05-04 PROCEDURE — 97140 MANUAL THERAPY 1/> REGIONS: CPT | Performed by: PHYSICAL THERAPIST

## 2017-05-04 PROCEDURE — 97110 THERAPEUTIC EXERCISES: CPT | Performed by: PHYSICAL THERAPIST

## 2017-05-04 NOTE — TELEPHONE ENCOUNTER
PATIENT CALLED FOR . PATIENT SAID SHE HAD SX DONE BY DR. Loev Anne TWO WEEKS AGO. THAT HER EMPLOYER IS EXPECTING HER TO RETURN TO WORK ON 05/09/17. PATIENT SAID SHE IS STILL IN [employer]CH[employer] AND HAS A POST OP APPOINTMENT TO SEE RELL RIVERA ON 05/09/17. PATIENT SAID SHE WOULD LIKE TO KNOW WHAT SHE CAN DO, BECAUSE SHE IS STILL IN [place]CH[place] AND HAS HER APPOINTMENT AND THEY EXPECT HER AT WORK. PATIENT IS REQUESTING A NOTE FROM . SAID THAT IF SHE IS SUPPOSE TO BE OUT OF WORK OR IF SHE IS SUPPOSED TO BE ON LIMITED DUTY EITHER WAY SHE NEEDS A NOTE STATING THIS FOR WORK. PATIENT WOULD LIKE THE NOTE FAX TO HER WORK AT FAX# [phone] ATT:CINTHIA ZHANG    PATIENT TEL. [phone]. PATIENT WOULD LIKE A CALL WHEN THIS IS DONE AND FOR SOME ONE TO TELL HER IF SHE IS TO RETURN TO WORK OR NOT.

## 2017-05-04 NOTE — PROGRESS NOTES
PT DAILY TREATMENT NOTE     Patient Name: Paz Bellamy  Date:2017  : 1970  [x]  Patient  Verified  Payor: BLUE CROSS MEDICAID / Plan: [facility] HEALTHKEEPERS PLUS / Product Type: Managed Care Medicaid /    In time:1043  Out time:1148  Total Treatment Time (min): 65  Visit #: 2 of 18    Treatment Area: Right knee pain [M25.561]  Other tear of medial meniscus, current injury, right knee, initial encounter [S83.241A]  Other tear of lateral meniscus, current injury, right knee, initial encounter [S83.281A]    SUBJECTIVE  Pain Level (0-10 scale): 6/10  Any medication changes, allergies to medications, adverse drug reactions, diagnosis change, or new procedure performed?: [x] No    [] Yes (see summary sheet for update)  Subjective functional status/changes:   [] No changes reported  Pt reports she doesn't have any pain medication in her today because she drove here    OBJECTIVE    Modality rationale: decrease edema, decrease inflammation and decrease pain to improve the patients ability to improve gait and activity tolerance    Min Type Additional Details    [] Estim:  []Unatt       []IFC  []Premod                        []Other:  []w/ice   []w/heat  Position:  Location:    [] Estim: []Att    []TENS instruct  []NMES                    []Other:  []w/US   []w/ice   []w/heat  Position:  Location:    []  Traction: [] Cervical       []Lumbar                       [] Prone          []Supine                       []Intermittent   []Continuous Lbs:  [] before manual  [] after manual    []  Ultrasound: []Continuous   [] Pulsed                           []1MHz   []3MHz W/cm2:  Location:    []  Iontophoresis with dexamethasone         Location: [] Take home patch   [] In clinic    []  Ice     []  heat  []  Ice massage  []  Laser   []  Anodyne Position:  Location:    []  Laser with stim  []  Other:  Position:  Location:   10 [x]  Vasopneumatic Device Pressure:       [x] lo [] med [] hi   Temperature: [x] lo [] med [] hi   [x] Skin assessment post-treatment:  [x]intact []redness- no adverse reaction    []redness  adverse reaction:      min []Eval                  []Re-Eval       47 min Therapeutic Exercise:  [x] See flow sheet :initiated per flow sheet   Rationale: increase ROM, increase strength, improve coordination, improve balance and increase proprioception to improve the patients ability to improve activity tolerance and decrease pain      min Therapeutic Activity:  []  See flow sheet :   Rationale:   to improve the patients ability to       min Neuromuscular Re-education:  []  See flow sheet :   Rationale:   to improve the patients ability to     8 min Manual Therapy:  Gentle patellar mobs, scar massage, PROM into flex and ext, popliteal release   Rationale: decrease pain, increase ROM, increase tissue extensibility and decrease trigger points to improve gait and activity tolerance      min Gait Training:  ___ feet with ___ device on level surfaces with ___ level of assist   Rationale: With   [] TE   [] TA   [] neuro   [] other: Patient Education: [x] Review HEP    [] Progressed/Changed HEP based on:   [] positioning   [] body mechanics   [] transfers   [] heat/ice application    [] other:      Other Objective/Functional Measures:      Pain Level (0-10 scale) post treatment: 5/10    ASSESSMENT/Changes in Function: Pt w/ poor tolerance to ther-ex and manual treatment today. Initiated per flow sheet but pt required increased assistance and VCs to complete     Patient will continue to benefit from skilled PT services to modify and progress therapeutic interventions, address functional mobility deficits, address ROM deficits, address strength deficits, analyze and address soft tissue restrictions, analyze and cue movement patterns, analyze and modify body mechanics/ergonomics, address imbalance/dizziness and instruct in home and community integration to attain remaining goals.      []  See Plan of Care  []  See progress note/recertification  []  See Discharge Summary         Progress towards goals / Updated goals:  Short term goals: to be accomplished in 2 weeks  1) Pt will report (I) and compliance with HEP for home management of symptoms. At eval: Instructed, demonstrated, and performed HEP  Current: met per pt repot 5/4/17  2) Pt will improve R knee AROM 5-90 deg for ease with ambulation. At eval: 10-85 deg  Long term goals: to be accomplished in 6 weeks  1) Pt's FOTO score will improve > or = 53 Indicating improvements in function. At eval: FOTO = 30  2) Pt will improve R knee AROM 0-115 deg for ease with ambulation. At eval: 10-85 deg  3) Pt will improve R LE MMT > or = 4/5 for functional strength during ambulation. At eval: hip flex 3+/5, hip abd 3+/5, knee ext 3-/5, knee flex 3+/5  4) Pt will demo good quad set indicating good quad strength during ambulation. At eval: pt presents with poor quad set  5) Pt will ambulate 500' with LRAD and normal gait for safe return to work duties.   At eval: pt ambulates with 2 axillary crutches and antalgic gait on R     PLAN  []  Upgrade activities as tolerated     [x]  Continue plan of care  []  Update interventions per flow sheet       []  Discharge due to:_  []  Other:_      Abrahamjaime Palomino, PT 5/4/2017  1:57 PM    Future Appointments  Date Time Provider Bernabe Garcia   5/8/2017 8:30 AM Patricia Deluca, PTA [facility]   5/9/2017 10:15 AM GINNY Pena   5/11/2017 9:30 AM Belle Monsivais, PT [facility]   5/12/2017 9:30 AM Daniel Brown, PTA [facility]   5/15/2017 10:30 AM Patricia Deluca, PTA [facility]   5/17/2017 10:30 AM Patricia Deluca, PTA [facility]   5/19/2017 10:30 AM Patricia E Laws, PTA [facility]   5/22/2017 10:30 AM Patricia E Laws, PTA [facility]   5/24/2017 10:30 AM Patricia E Laws, PTA [facility]   5/26/2017 10:30 AM Patricia E Laws, PTA [facility]   5/30/2017 11:00 AM Daniel Brown, PTA [facility]   5/31/2017 9:00 AM Karl RAMIREZ Daphne Gabriel

## 2017-05-04 NOTE — LETTER
NOTIFICATION RETURN TO WORK / SCHOOL 
 
5/5/2017 1:14 PM 
 
Ms. Paz Bellamy [id number] [place] [id number] To Whom It May Concern: 
 
Paz Bellamy is currently under the care of [address] [place] [address] Kailash Greene. She may return to work on 5-9-17 at sedentary duty only with no walking or standing longer than 15 minutes at a time. If this is not available, then she needs to be on a no duty status until after her follow up appointment on 5-9-17. If there are questions or concerns please have the patient contact our office. Sincerely, John Cabrera MD

## 2017-05-05 NOTE — TELEPHONE ENCOUNTER
POST OP PATIENT CALLED AGAIN AND IS REQUESTING A CALL BACK AS SOON AS POSSIBLE ABOUT THE NOTE THAT SHE NEEDS FOR WORK. PATIENT TEL. [phone].

## 2017-05-05 NOTE — TELEPHONE ENCOUNTER
Patient contacted and note written.  She was switched from [facility] to [employer] [facility]

## 2017-05-05 NOTE — TELEPHONE ENCOUNTER
1. Her f/u needs to  Be with me not Brien Huntley. 2. She can return to work sedentary duty only. No walking or standing greater than 15 mins  3. Cont with PT.  She should be off her crutches by now

## 2017-05-08 ENCOUNTER — APPOINTMENT (OUTPATIENT)
Dept: PHYSICAL THERAPY | Age: 47
End: 2017-05-08
Payer: MEDICAID

## 2017-05-09 ENCOUNTER — OFFICE VISIT (OUTPATIENT)
Dept: ORTHOPEDIC SURGERY | Age: 47
End: 2017-05-09

## 2017-05-09 VITALS
TEMPERATURE: 37.4 F | WEIGHT: 257 LBS | BODY MASS INDEX: 47.29 KG/M2 | DIASTOLIC BLOOD PRESSURE: 78 MMHG | HEART RATE: 84 BPM | SYSTOLIC BLOOD PRESSURE: 130 MMHG | HEIGHT: 62 IN

## 2017-05-09 DIAGNOSIS — S83.241D ACUTE MEDIAL MENISCUS TEAR, RIGHT, SUBSEQUENT ENCOUNTER: Primary | ICD-10-CM

## 2017-05-09 DIAGNOSIS — M17.11 PRIMARY OSTEOARTHRITIS OF RIGHT KNEE: ICD-10-CM

## 2017-05-09 DIAGNOSIS — S83.281D ACUTE LATERAL MENISCUS TEAR OF RIGHT KNEE, SUBSEQUENT ENCOUNTER: ICD-10-CM

## 2017-05-09 RX ORDER — IBUPROFEN 800 MG/1
800 TABLET ORAL
COMMUNITY

## 2017-05-09 NOTE — PROGRESS NOTES
Patient: Ashley Gold  YOB: 1970       HISTORY:  The patient presents for reevaluation of her right knee status post arthroscopic partial medial and lateral menisectomy and chondroplasty on 4/14/17. Patient is improved, states pain is a 3 out of 10.  she has gone to physical therapy and has been to 2 sessions of PT. She is still on one crutch today. Patient denies any fever, chills, chest pain, shortness of breath or calf pain. There are no new illness or injuries to report since last seen in the office. PHYSICAL EXAMINATION:    Visit Vitals    LMP 02/22/2017     The patient is a well-developed, well-nourished female in no acute distress. The patient is alert and oriented times three. The patient appears to be well groomed. Mood and affect are normal.   ORTHOPEDIC EXAM of Right knee: Inspection: Effusion present,  incisions well healed  TTP: medial joint line  Range of motion: 0-120 flexion  Stability: Stable  Strength: 5/5  2+ distal pulses    IMPRESSION:  Status post Right knee arthroscopic partial medial and lateral menisectomy and chondroplasty. PLAN:   1. Patient is improving in the post operative period  2. Will continue with NSAIDs with food for inflammation  3. Will cont with PT. Goal to be off crutches in 1 week  4.  May need to consider euflexxa injections at next visit  RTC 3 weeks    Patient seen and evaluated by Dr. Aníbal Vasquez today who agrees with treatment plan      GINNY Denton [facility]

## 2017-05-09 NOTE — LETTER
NOTIFICATION RETURN TO WORK / SCHOOL 
 
5/9/2017 10:51 AM 
 
Ms. Junior Luo [address] [place]t [id number] To Whom It May Concern: 
 
Junior Luo is currently under the care of [address] [place] [address] Kailash Greene. She will remain out of work for the next 4 weeks. If there are questions or concerns please have the patient contact our office. Sincerely, RELL Oliva

## 2017-05-11 ENCOUNTER — APPOINTMENT (OUTPATIENT)
Dept: PHYSICAL THERAPY | Age: 47
End: 2017-05-11
Payer: MEDICAID

## 2017-05-12 ENCOUNTER — HOSPITAL ENCOUNTER (OUTPATIENT)
Dept: PHYSICAL THERAPY | Age: 47
Discharge: HOME | End: 2017-05-12
Payer: MEDICAID

## 2017-05-12 PROCEDURE — 97140 MANUAL THERAPY 1/> REGIONS: CPT

## 2017-05-12 PROCEDURE — 97110 THERAPEUTIC EXERCISES: CPT

## 2017-05-12 PROCEDURE — 97016 VASOPNEUMATIC DEVICE THERAPY: CPT

## 2017-05-12 NOTE — PROGRESS NOTES
PT DAILY TREATMENT NOTE     Patient Name: Cortez Chaney  Date:2017  : 1970  [x]  Patient  Verified  Payor: BLUE CROSS MEDICAID / Plan: [facility] HEALTHKEEPERS PLUS / Product Type: Managed Care Medicaid /    In time:9:31  Out time:10:28  Total Treatment Time (min): 62  Visit #: 3 of 18    Treatment Area: Right knee pain [M25.561]  Other tear of medial meniscus, current injury, right knee, initial encounter [S83.241A]  Other tear of lateral meniscus, current injury, right knee, initial encounter [S83.281A]    SUBJECTIVE  Pain Level (0-10 scale): 4  Any medication changes, allergies to medications, adverse drug reactions, diagnosis change, or new procedure performed?: [x] No    [] Yes (see summary sheet for update)  Subjective functional status/changes:   [] No changes reported  Pt reports she did her home exercises yesterday and then went to [employer] and she was surprised how much she felt better after doing her exercises. OBJECTIVE    Modality rationale: decrease pain to improve the patients ability to decrease difficulty while performing tasks.     Min Type Additional Details    [] Estim:  []Unatt       []IFC  []Premod                        []Other:  []w/ice   []w/heat  Position:  Location:    [] Estim: []Att    []TENS instruct  []NMES                    []Other:  []w/US   []w/ice   []w/heat  Position:  Location:    []  Traction: [] Cervical       []Lumbar                       [] Prone          []Supine                       []Intermittent   []Continuous Lbs:  [] before manual  [] after manual    []  Ultrasound: []Continuous   [] Pulsed                           []1MHz   []3MHz W/cm2:  Location:    []  Iontophoresis with dexamethasone         Location: [] Take home patch   [] In clinic    []  Ice     []  heat  []  Ice massage  []  Laser   []  Anodyne Position:  Location:    []  Laser with stim  []  Other:  Position:  Location:   10 [x]  Vasopneumatic Device Pressure:       [] lo [x] med [] hi   Temperature: [x] lo [] med [] hi   [] Skin assessment post-treatment:  []intact []redness- no adverse reaction    []redness  adverse reaction:       37 min Therapeutic Exercise:  [x] See flow sheet :   Rationale: increase ROM and increase strength to improve the patients ability to increase tolerance to activities. 10 min Manual Therapy:  Per flow sheet   Rationale: decrease pain, increase ROM, increase tissue extensibility and decrease trigger points to increase ease with ADLs. With   [] TE   [] TA   [] neuro   [] other: Patient Education: [x] Review HEP    [] Progressed/Changed HEP based on:   [] positioning   [] body mechanics   [] transfers   [] heat/ice application    [] other:      Other Objective/Functional Measures: Pt ambulated in clinic with one crutch. Pain Level (0-10 scale) post treatment: 4    ASSESSMENT/Changes in Function: Pt very TTP along posterior knee. Continue per POC. Patient will continue to benefit from skilled PT services to modify and progress therapeutic interventions, address functional mobility deficits, address ROM deficits, address strength deficits and analyze and address soft tissue restrictions to attain remaining goals. []  See Plan of Care  []  See progress note/recertification  []  See Discharge Summary         Progress towards goals / Updated goals:  Short term goals: to be accomplished in 2 weeks  1) Pt will report (I) and compliance with HEP for home management of symptoms. At eval: Instructed, demonstrated, and performed HEP  Current: met per pt repot 5/4/17  2) Pt will improve R knee AROM 5-90 deg for ease with ambulation. At eval: 10-85 deg  Long term goals: to be accomplished in 6 weeks  1) Pt's FOTO score will improve > or = 53 Indicating improvements in function. At eval: FOTO = 30  2) Pt will improve R knee AROM 0-115 deg for ease with ambulation.   At eval: 10-85 deg  3) Pt will improve R LE MMT > or = 4/5 for functional strength during ambulation. At eval: hip flex 3+/5, hip abd 3+/5, knee ext 3-/5, knee flex 3+/5  4) Pt will demo good quad set indicating good quad strength during ambulation. At eval: pt presents with poor quad set  5) Pt will ambulate 500' with LRAD and normal gait for safe return to work duties.   At eval: pt ambulates with 2 axillary crutches and antalgic gait on R     PLAN  []  Upgrade activities as tolerated     [x]  Continue plan of care  []  Update interventions per flow sheet       []  Discharge due to:_  []  Other:_      Sae Skinner PTA 5/12/2017  9:37 AM    Future Appointments  Date Time Provider Bernabe Garcia   5/15/2017 10:30 AM Patricia E Laws, PTA [facility]   5/17/2017 10:30 AM Patricia E Laws, PTA [facility]   5/19/2017 10:30 AM Patricia E Laws, PTA [facility]   5/22/2017 10:30 AM Patricia E Laws, PTA [facility]   5/24/2017 10:30 AM Patricia E Laws, PTA [facility]   5/26/2017 10:30 AM Patricia E Laws, PTA [facility]   5/30/2017 11:00 AM Sae Skinner PTA [facility]   5/30/2017 1:30 PM RELL ManriquezMD [facility] SCHED   5/31/2017 9:00 AM Sae Skinner PTA [facility]

## 2017-05-15 ENCOUNTER — HOSPITAL ENCOUNTER (OUTPATIENT)
Dept: PHYSICAL THERAPY | Age: 47
Discharge: HOME | End: 2017-05-15
Payer: MEDICAID

## 2017-05-15 PROCEDURE — 97140 MANUAL THERAPY 1/> REGIONS: CPT

## 2017-05-15 PROCEDURE — 97110 THERAPEUTIC EXERCISES: CPT

## 2017-05-15 PROCEDURE — 97016 VASOPNEUMATIC DEVICE THERAPY: CPT

## 2017-05-15 NOTE — PROGRESS NOTES
PT DAILY TREATMENT NOTE     Patient Name: Liberty Mcneil  Date:5/15/2017  : 1970  [x]  Patient  Verified  Payor: BLUE CROSS MEDICAID / Plan: [facility] HEALTHKEEPERS PLUS / Product Type: Managed Care Medicaid /    In time: 10:27  Out time: 11:27  Total Treatment Time (min): 60  Visit #: 4 of 18    Treatment Area: Right knee pain [M25.561]  Other tear of medial meniscus, current injury, right knee, initial encounter [D88.892P]  Other tear of lateral meniscus, current injury, right knee, initial encounter [S83.281A]    SUBJECTIVE  Pain Level (0-10 scale): 4/10  Any medication changes, allergies to medications, adverse drug reactions, diagnosis change, or new procedure performed?: [x] No    [] Yes (see summary sheet for update)  Subjective functional status/changes:   [] No changes reported  Pt states she still uses the walker when she gets up from sitting and a short while when she gets up and walking. OBJECTIVE    Modality rationale: decrease edema and decrease pain to improve the patients ability to perform ADLs.     Min Type Additional Details    [] Estim:  []Unatt       []IFC  []Premod                        []Other:  []w/ice   []w/heat  Position:  Location:    [] Estim: []Att    []TENS instruct  []NMES                    []Other:  []w/US   []w/ice   []w/heat  Position:  Location:    []  Traction: [] Cervical       []Lumbar                       [] Prone          []Supine                       []Intermittent   []Continuous Lbs:  [] before manual  [] after manual    []  Ultrasound: []Continuous   [] Pulsed                           []1MHz   []3MHz W/cm2:  Location:    []  Iontophoresis with dexamethasone         Location: [] Take home patch   [] In clinic    []  Ice     []  heat  []  Ice massage  []  Laser   []  Anodyne Position:  Location:    []  Laser with stim  []  Other:  Position:  Location:   10 [x]  Vasopneumatic Device Pressure:       [] lo [x] med [] hi   Temperature: [x] lo [] med [] hi   [] Skin assessment post-treatment:  []intact []redness- no adverse reaction    []redness  adverse reaction:     40 min Therapeutic Exercise:  [x] See flow sheet :   Rationale: increase ROM and increase strength to improve the patients ability to perform ADLs. 10 min Manual Therapy:  Per flow sheet   Rationale: decrease pain, increase ROM and increase tissue extensibility to increase ease of ADLs. With   [] TE   [] TA   [] neuro   [] other: Patient Education: [x] Review HEP    [] Progressed/Changed HEP based on:   [] positioning   [] body mechanics   [] transfers   [] heat/ice application    [] other:      Other Objective/Functional Measures:  AROM 8-100 degrees. Pain Level (0-10 scale) post treatment: 3/10    ASSESSMENT/Changes in Function: Cont per POC. Patient will continue to benefit from skilled PT services to modify and progress therapeutic interventions, address functional mobility deficits, address ROM deficits and address strength deficits to attain remaining goals. []  See Plan of Care  []  See progress note/recertification  []  See Discharge Summary         Progress towards goals / Updated goals:  Short term goals: to be accomplished in 2 weeks  1) Pt will report (I) and compliance with HEP for home management of symptoms. At eval: Instructed, demonstrated, and performed HEP  Current: met per pt repot 5/4/17  2) Pt will improve R knee AROM 5-90 deg for ease with ambulation. At eval: 10-85 deg  Current: Progressing, 8-100 degrees. 5/15/17  Long term goals: to be accomplished in 6 weeks  1) Pt's FOTO score will improve > or = 53 Indicating improvements in function. At eval: FOTO = 30  2) Pt will improve R knee AROM 0-115 deg for ease with ambulation. At eval: 10-85 deg  Current: Progressing, 8-100 degrees. 5/15/17  3) Pt will improve R LE MMT > or = 4/5 for functional strength during ambulation.   At eval: hip flex 3+/5, hip abd 3+/5, knee ext 3-/5, knee flex 3+/5  4) Pt will demo good quad set indicating good quad strength during ambulation. At eval: pt presents with poor quad set  5) Pt will ambulate 500' with LRAD and normal gait for safe return to work duties.   At eval: pt ambulates with 2 axillary crutches and antalgic gait on R       PLAN  []  Upgrade activities as tolerated     [x]  Continue plan of care  []  Update interventions per flow sheet       []  Discharge due to:_  []  Other:_      Patricia Deluca, PTA 5/15/2017  11:08 AM    Future Appointments  Date Time Provider Bernabe Garcia   5/17/2017 10:30 AM Patriciapalomo Deluca, PTA [facility]   5/19/2017 10:30 AM Patricia BARONE Laws, PTA [facility]   5/22/2017 10:30 AM Patricia E Laws, PTA [facility]   5/24/2017 10:30 AM Patricia E Laws, PTA [facility]   5/26/2017 10:30 AM Patricia E Laws, PTA [facility]   5/30/2017 11:00 AM Daniel Brown, PTA [facility]   5/30/2017 1:30 PM RELL Frey [facility] SCHED   5/31/2017 9:00 AM Daniel Brown, PTA [facility]

## 2017-05-17 ENCOUNTER — HOSPITAL ENCOUNTER (OUTPATIENT)
Dept: PHYSICAL THERAPY | Age: 47
Discharge: HOME | End: 2017-05-17
Payer: MEDICAID

## 2017-05-17 PROCEDURE — 97016 VASOPNEUMATIC DEVICE THERAPY: CPT

## 2017-05-17 PROCEDURE — 97140 MANUAL THERAPY 1/> REGIONS: CPT

## 2017-05-17 PROCEDURE — 97110 THERAPEUTIC EXERCISES: CPT

## 2017-05-17 NOTE — PROGRESS NOTES
PT DAILY TREATMENT NOTE     Patient Name: July Mcnamara  Date:2017  : 1970  [x]  Patient  Verified  Payor: BLUE CROSS MEDICAID / Plan: [facility] [employer]KEEPERS PLUS / Product Type: Managed Care Medicaid /    In time: 10:18  Out time:11:11  Total Treatment Time (min): 48  Visit #: 5 of 18    Treatment Area: Right knee pain [M25.561]  Other tear of medial meniscus, current injury, right knee, initial encounter [Y15.066O]  Other tear of lateral meniscus, current injury, right knee, initial encounter [S83.281A]    SUBJECTIVE  Pain Level (0-10 scale): 3/10  Any medication changes, allergies to medications, adverse drug reactions, diagnosis change, or new procedure performed?: [x] No    [] Yes (see summary sheet for update)  Subjective functional status/changes:   [] No changes reported  Pt states she is going to talk to Dr. Peryr Segovia about a TKR. OBJECTIVE    Modality rationale: decrease edema and decrease pain to improve the patients ability to increase ease of caring for son.    Min Type Additional Details    [] Estim:  []Unatt       []IFC  []Premod                        []Other:  []w/ice   []w/heat  Position:  Location:    [] Estim: []Att    []TENS instruct  []NMES                    []Other:  []w/US   []w/ice   []w/heat  Position:  Location:    []  Traction: [] Cervical       []Lumbar                       [] Prone          []Supine                       []Intermittent   []Continuous Lbs:  [] before manual  [] after manual    []  Ultrasound: []Continuous   [] Pulsed                           []1MHz   []3MHz W/cm2:  Location:    []  Iontophoresis with dexamethasone         Location: [] Take home patch   [] In clinic    []  Ice     []  heat  []  Ice massage  []  Laser   []  Anodyne Position:  Location:    []  Laser with stim  []  Other:  Position:  Location:   10 [x]  Vasopneumatic Device Pressure:       [x] lo [] med [] hi   Temperature: [x] lo [] med [] hi   [] Skin assessment post-treatment:  []intact []redness- no adverse reaction    []redness  adverse reaction:     33 min Therapeutic Exercise:  [x] See flow sheet :   Rationale: increase ROM and increase strength to improve the patients ability to perform ADLs. 10 min Manual Therapy:  Per flow sheet   Rationale: decrease pain, increase ROM and increase tissue extensibility to increase ease of gait. With   [] TE   [] TA   [] neuro   [] other: Patient Education: [x] Review HEP    [] Progressed/Changed HEP based on:   [] positioning   [] body mechanics   [] transfers   [] heat/ice application    [] other:      Other Objective/Functional Measures: Quad set is fair. Pain Level (0-10 scale) post treatment:  2.5/10    ASSESSMENT/Changes in Function:  Cont per POC. Patient will continue to benefit from skilled PT services to modify and progress therapeutic interventions, address functional mobility deficits, address ROM deficits and address strength deficits to attain remaining goals. []  See Plan of Care  []  See progress note/recertification  []  See Discharge Summary         Progress towards goals / Updated goals:  Short term goals: to be accomplished in 2 weeks  1) Pt will report (I) and compliance with HEP for home management of symptoms. At eval: Instructed, demonstrated, and performed HEP  Current: met per pt repot 5/4/17  2) Pt will improve R knee AROM 5-90 deg for ease with ambulation. At eval: 10-85 deg  Current: Progressing, 8-100 degrees. 5/15/17  Long term goals: to be accomplished in 6 weeks  1) Pt's FOTO score will improve > or = 53 Indicating improvements in function. At eval: FOTO = 30  2) Pt will improve R knee AROM 0-115 deg for ease with ambulation. At eval: 10-85 deg  Current: Progressing, 8-100 degrees. 5/15/17  3) Pt will improve R LE MMT > or = 4/5 for functional strength during ambulation.   At eval: hip flex 3+/5, hip abd 3+/5, knee ext 3-/5, knee flex 3+/5  4) Pt will demo good quad set indicating good quad strength during ambulation. At eval: pt presents with poor quad set. Current: Progressing, fair quad set. 5/17/17  5) Pt will ambulate 500' with LRAD and normal gait for safe return to work duties.   At eval: pt ambulates with 2 axillary crutches and antalgic gait on R     PLAN  []  Upgrade activities as tolerated     [x]  Continue plan of care  []  Update interventions per flow sheet       []  Discharge due to:_  []  Other:_      Patricia Deluca, PTA 5/17/2017  11:02 AM    Future Appointments  Date Time Provider Bernabe Garcia   5/19/2017 10:30 AM Patricia Deluca, PTA [facility]   5/22/2017 10:30 AM Patricia Deluca, PTA [facility]   5/24/2017 10:30 AM Patricia Deluca, PTA [facility]   5/26/2017 10:30 AM Patricia Deluca, PTA [facility]   5/30/2017 11:00 AM Estefania Alfaro, PTA [facility]   5/30/2017 1:30 PM RELL Tuttle   5/31/2017 9:00 AM Estefania Alfaro, PTA [facility]

## 2017-05-19 ENCOUNTER — APPOINTMENT (OUTPATIENT)
Dept: PHYSICAL THERAPY | Age: 47
End: 2017-05-19
Payer: MEDICAID

## 2017-05-22 ENCOUNTER — HOSPITAL ENCOUNTER (OUTPATIENT)
Dept: PHYSICAL THERAPY | Age: 47
Discharge: HOME | End: 2017-05-22
Payer: MEDICAID

## 2017-05-22 PROCEDURE — 97110 THERAPEUTIC EXERCISES: CPT

## 2017-05-22 PROCEDURE — 97140 MANUAL THERAPY 1/> REGIONS: CPT

## 2017-05-22 PROCEDURE — 97016 VASOPNEUMATIC DEVICE THERAPY: CPT

## 2017-05-22 NOTE — PROGRESS NOTES
PT DAILY TREATMENT NOTE     Patient Name: Edmundo Franklin  Date:2017  : 1970  [x]  Patient  Verified  Payor: BLUE CROSS MEDICAID / Plan: [facility] Kevin Luevano / Product Type: Managed Care Medicaid /    In time: 10:30  Out time: 11:30  Total Treatment Time (min): 60  Visit #: 6 of 18    Treatment Area: Right knee pain [M25.561]  Other tear of medial meniscus, current injury, right knee, initial encounter [H89.124N]  Other tear of lateral meniscus, current injury, right knee, initial encounter [S83.281A]    SUBJECTIVE  Pain Level (0-10 scale): 510  Any medication changes, allergies to medications, adverse drug reactions, diagnosis change, or new procedure performed?: [x] No    [] Yes (see summary sheet for update)  Subjective functional status/changes:   [] No changes reported  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 OBJECTIVE    Modality rationale: decrease edema and decrease pain to improve the patients ability to perform ADLs.     Min Type Additional Details    [] Estim:  []Unatt       []IFC  []Premod                        []Other:  []w/ice   []w/heat  Position:  Location:    [] Estim: []Att    []TENS instruct  []NMES                    []Other:  []w/US   []w/ice   []w/heat  Position:  Location:    []  Traction: [] Cervical       []Lumbar                       [] Prone          []Supine                       []Intermittent   []Continuous Lbs:  [] before manual  [] after manual    []  Ultrasound: []Continuous   [] Pulsed                           []1MHz   []3MHz W/cm2:  Location:    []  Iontophoresis with dexamethasone         Location: [] Take home patch   [] In clinic    []  Ice     []  heat  []  Ice massage  []  Laser   []  Anodyne Position:  Location:    []  Laser with stim  []  Other:  Position:  Location:   10 [x]  Vasopneumatic Device Pressure:       [] lo [x] med [] hi   Temperature: [] lo [] med [] hi   [] Skin assessment post-treatment:  []intact []redness- no adverse reaction    []redness  adverse reaction:     40 min Therapeutic Exercise:  [x] See flow sheet :   Rationale: increase ROM and increase strength to improve the patients ability to increase ease of ADLs. 10 min Manual Therapy:  Per flow sheet   Rationale: decrease pain, increase ROM and increase tissue extensibility to increase ease of ADLs. With   [] TE   [] TA   [] neuro   [] other: Patient Education: [x] Review HEP    [] Progressed/Changed HEP based on:   [] positioning   [] body mechanics   [] transfers   [] heat/ice application    [] other:      Other Objective/Functional Measures:  FOTO 34, an increase of 4. Pain Level (0-10 scale) post treatment: 4/10    ASSESSMENT/Changes in Function: Cont per POC. Patient will continue to benefit from skilled PT services to modify and progress therapeutic interventions, address functional mobility deficits, address ROM deficits and address strength deficits to attain remaining goals. []  See Plan of Care  []  See progress note/recertification  []  See Discharge Summary         Progress towards goals / Updated goals:  Short term goals: to be accomplished in 2 weeks  1) Pt will report (I) and compliance with HEP for home management of symptoms. At eval: Instructed, demonstrated, and performed HEP  Current: met per pt repot 5/4/17  2) Pt will improve R knee AROM 5-90 deg for ease with ambulation. At eval: 10-85 deg  Current: Progressing, 8-100 degrees. 5/15/17  Long term goals: to be accomplished in 6 weeks  1) Pt's FOTO score will improve > or = 53 Indicating improvements in function. At eval: FOTO = 30  Current: Progressing, 34, an increase of 4 since IE.  5/22/17  2) Pt will improve R knee AROM 0-115 deg for ease with ambulation. At eval: 10-85 deg  Current: Progressing, 8-100 degrees. 5/15/17  3) Pt will improve R LE MMT > or = 4/5 for functional strength during ambulation.   At eval: hip flex 3+/5, hip abd 3+/5, knee ext 3-/5, knee flex 3+/5  4) Pt will demo good quad set indicating good quad strength during ambulation. At eval: pt presents with poor quad set. Current: Progressing, fair quad set. 5/17/17  5) Pt will ambulate 500' with LRAD and normal gait for safe return to work duties.   At eval: pt ambulates with 2 axillary crutches and antalgic gait on R    PLAN  []  Upgrade activities as tolerated     [x]  Continue plan of care  []  Update interventions per flow sheet       []  Discharge due to:_  []  Other:_      Patricia Deluca PTA 5/22/2017  10:42 AM    Future Appointments  Date Time Provider Bernabe Garcia   5/24/2017 10:30 AM Patricia Deluca PTA [facility]   5/26/2017 10:30 AM Patricia Deluca PTA [facility]   5/30/2017 11:00 AM Barbara Aponte PTA [facility]   5/30/2017 1:30 PM [facility]   5/31/2017 9:00 AM Barbara Aponte PTA [facility]

## 2017-05-24 ENCOUNTER — HOSPITAL ENCOUNTER (OUTPATIENT)
Dept: PHYSICAL THERAPY | Age: 47
Discharge: HOME | End: 2017-05-24
Payer: MEDICAID

## 2017-05-24 PROCEDURE — 97016 VASOPNEUMATIC DEVICE THERAPY: CPT

## 2017-05-24 PROCEDURE — 97140 MANUAL THERAPY 1/> REGIONS: CPT

## 2017-05-24 PROCEDURE — 97110 THERAPEUTIC EXERCISES: CPT

## 2017-05-24 NOTE — PROGRESS NOTES
PT DAILY TREATMENT NOTE     Patient Name: Shubham Meléndez  Date:2017  : 1970  [x]  Patient  Verified  Payor: BLUE CROSS MEDICAID / Plan: [facility] [employer] / Product Type: Managed Care Medicaid /    In time:10:50  Out time:11:44  Total Treatment Time (min): 54  Visit #: 7 of 18    Treatment Area: Right knee pain [M25.561]  Other tear of medial meniscus, current injury, right knee, initial encounter [C55.345X]  Other tear of lateral meniscus, current injury, right knee, initial encounter [S83.281A]    SUBJECTIVE  Pain Level (0-10 scale): 4  Any medication changes, allergies to medications, adverse drug reactions, diagnosis change, or new procedure performed?: [x] No    [] Yes (see summary sheet for update)  Subjective functional status/changes:   [] No changes reported  Pt reports that her knee is bothering her today because she was standing and twisted her knee. OBJECTIVE    Modality rationale: decrease pain to improve the patients ability to decrease difficulty while performing tasks.     Min Type Additional Details    [] Estim:  []Unatt       []IFC  []Premod                        []Other:  []w/ice   []w/heat  Position:  Location:    [] Estim: []Att    []TENS instruct  []NMES                    []Other:  []w/US   []w/ice   []w/heat  Position:  Location:    []  Traction: [] Cervical       []Lumbar                       [] Prone          []Supine                       []Intermittent   []Continuous Lbs:  [] before manual  [] after manual    []  Ultrasound: []Continuous   [] Pulsed                           []1MHz   []3MHz W/cm2:  Location:    []  Iontophoresis with dexamethasone         Location: [] Take home patch   [] In clinic    []  Ice     []  heat  []  Ice massage  []  Laser   []  Anodyne Position:  Location:    []  Laser with stim  []  Other:  Position:  Location:   10 [x]  Vasopneumatic Device Pressure:       [] lo [x] med [] hi   Temperature: [x] lo [] med [] hi   [] Skin assessment post-treatment:  []intact []redness- no adverse reaction    []redness  adverse reaction:       36 min Therapeutic Exercise: [x] See flow sheet :   Rationale: increase ROM and increase strength to improve the patients ability to increase tolerance to activities.         8 min Manual Therapy: Per flow sheet   Rationale: decrease pain, increase ROM, increase tissue extensibility and decrease trigger points to increase ease with ADLs. With   [] TE   [] TA   [] neuro   [] other: Patient Education: [x] Review HEP    [] Progressed/Changed HEP based on:   [] positioning   [] body mechanics   [] transfers   [] heat/ice application    [] other:      Other Objective/Functional Measures:  R knee AROM 6-110 degrees. Pain Level (0-10 scale) post treatment: 3    ASSESSMENT/Changes in Function: Pt is ambulating with 1 axillary crutch with more antalgic gait today. Continue per POC. Patient will continue to benefit from skilled PT services to modify and progress therapeutic interventions, address functional mobility deficits, address ROM deficits, address strength deficits and analyze and address soft tissue restrictions to attain remaining goals. []  See Plan of Care  []  See progress note/recertification  []  See Discharge Summary         Progress towards goals / Updated goals:  Short term goals: to be accomplished in 2 weeks  1) Pt will report (I) and compliance with HEP for home management of symptoms. At eval: Instructed, demonstrated, and performed HEP  Current: met per pt repot 5/4/17  2) Pt will improve R knee AROM 5-90 deg for ease with ambulation. At eval: 10-85 deg  Current: Progressing, 8-100 degrees. 5/15/17  Long term goals: to be accomplished in 6 weeks  1) Pt's FOTO score will improve > or = 53 Indicating improvements in function.   At eval: FOTO = 30  Current: Progressing, 34, an increase of 4 since IE. 5/22/17  2) Pt will improve R knee AROM 0-115 deg for ease with ambulation. At eval: 10-85 deg  Current: Progressing, R knee AROM 6-110 degrees. 5/24/17  3) Pt will improve R LE MMT > or = 4/5 for functional strength during ambulation. At eval: hip flex 3+/5, hip abd 3+/5, knee ext 3-/5, knee flex 3+/5  4) Pt will demo good quad set indicating good quad strength during ambulation. At eval: pt presents with poor quad set. Current: Progressing, fair quad set. 5/17/17  5) Pt will ambulate 500' with LRAD and normal gait for safe return to work duties.   At eval: pt ambulates with 2 axillary crutches and antalgic gait on R    PLAN  []  Upgrade activities as tolerated     [x]  Continue plan of care  []  Update interventions per flow sheet       []  Discharge due to:_  []  Other:_      Sapphire Condon PTA 5/24/2017  11:08 AM    Future Appointments  Date Time Provider Bernabe Carlsonisti   5/26/2017 10:30 AM Patricia Deluca PTA [facility]   5/30/2017 11:00 AM Sapphire Condon PTA [facility]   5/30/2017 1:30 PM RELL DownsENA SCHED   5/31/2017 9:00 AM Sapphire Condon PTA [facility]

## 2017-05-25 ENCOUNTER — DOCUMENTATION ONLY (OUTPATIENT)
Dept: ORTHOPEDIC SURGERY | Age: 47
End: 2017-05-25

## 2017-05-25 NOTE — PROGRESS NOTES
Records request received from Dina of [address] [facility] 9-06-24, faxed to Yolanda Morgan at [facility]

## 2017-05-26 ENCOUNTER — HOSPITAL ENCOUNTER (OUTPATIENT)
Dept: PHYSICAL THERAPY | Age: 47
Discharge: HOME | End: 2017-05-26
Payer: MEDICAID

## 2017-05-26 ENCOUNTER — APPOINTMENT (OUTPATIENT)
Dept: PHYSICAL THERAPY | Age: 47
End: 2017-05-26
Payer: MEDICAID

## 2017-05-26 PROCEDURE — 97110 THERAPEUTIC EXERCISES: CPT | Performed by: PHYSICAL THERAPIST

## 2017-05-26 PROCEDURE — 97016 VASOPNEUMATIC DEVICE THERAPY: CPT | Performed by: PHYSICAL THERAPIST

## 2017-05-26 PROCEDURE — 97140 MANUAL THERAPY 1/> REGIONS: CPT | Performed by: PHYSICAL THERAPIST

## 2017-05-26 NOTE — PROGRESS NOTES
PT DAILY TREATMENT NOTE     Patient Name: Dayana Peer  Date:2017  : 1970  [x]  Patient  Verified  Payor: BLUE CROSS MEDICAID / Plan: [facility] / Product Type: Managed Care Medicaid /    In time:229  Out time:322  Total Treatment Time (min): 48  Visit #: 8 of 18    Treatment Area: Right knee pain [M25.561]  Other tear of medial meniscus, current injury, right knee, initial encounter [S83.241A]  Other tear of lateral meniscus, current injury, right knee, initial encounter [S83.281A]    SUBJECTIVE  Pain Level (0-10 scale): 5/10  Any medication changes, allergies to medications, adverse drug reactions, diagnosis change, or new procedure performed?: [x] No    [] Yes (see summary sheet for update)  Subjective functional status/changes:   [] No changes reported  Pt reports she feels that she's getting better    OBJECTIVE    Modality rationale: decrease edema, decrease inflammation and decrease pain to improve the patients ability to improve gait and activity tolerance    Min Type Additional Details    [] Estim:  []Unatt       []IFC  []Premod                        []Other:  []w/ice   []w/heat  Position:  Location:    [] Estim: []Att    []TENS instruct  []NMES                    []Other:  []w/US   []w/ice   []w/heat  Position:  Location:    []  Traction: [] Cervical       []Lumbar                       [] Prone          []Supine                       []Intermittent   []Continuous Lbs:  [] before manual  [] after manual    []  Ultrasound: []Continuous   [] Pulsed                           []1MHz   []3MHz W/cm2:  Location:    []  Iontophoresis with dexamethasone         Location: [] Take home patch   [] In clinic    []  Ice     []  heat  []  Ice massage  []  Laser   []  Anodyne Position:  Location:    []  Laser with stim  []  Other:  Position:  Location:   10 [x]  Vasopneumatic Device Pressure:       [] lo [x] med [] hi   Temperature: [x] lo [] med [] hi   [] Skin assessment post-treatment:  []intact []redness- no adverse reaction    []redness  adverse reaction:      min []Eval                  []Re-Eval       35 min Therapeutic Exercise:  [x] See flow sheet : increased per flowsheet   Rationale: increase ROM, increase strength, improve coordination, improve balance and increase proprioception to improve the patients ability to decrease pain and improve activity tolerance      min Therapeutic Activity:  []  See flow sheet :   Rationale:   to improve the patients ability to       min Neuromuscular Re-education:  []  See flow sheet :   Rationale:   to improve the patients ability to     8 min Manual Therapy: Gentle patellar mobs, scar massage, PROM into flex and ext, popliteal release    Rationale: decrease pain, increase ROM, increase tissue extensibility and decrease trigger points to improve activity tolerance      min Gait Training:  ___ feet with ___ device on level surfaces with ___ level of assist   Rationale: With   [] TE   [] TA   [] neuro   [] other: Patient Education: [x] Review HEP    [] Progressed/Changed HEP based on:   [] positioning   [] body mechanics   [] transfers   [] heat/ice application    [] other:      Other Objective/Functional Measures: AROM 6-117 deg     Pain Level (0-10 scale) post treatment: 4/10    ASSESSMENT/Changes in Function: improving flexion but extension is still limited to -6 deg     Patient will continue to benefit from skilled PT services to modify and progress therapeutic interventions, address functional mobility deficits, address ROM deficits, address strength deficits, analyze and address soft tissue restrictions, analyze and cue movement patterns, address imbalance/dizziness and instruct in home and community integration to attain remaining goals.      []  See Plan of Care  []  See progress note/recertification  []  See Discharge Summary         Progress towards goals / Updated goals:  Short term goals: to be accomplished in 2 weeks  1) Pt will report (I) and compliance with HEP for home management of symptoms. At eval: Instructed, demonstrated, and performed HEP  Current: met per pt repot 5/4/17  2) Pt will improve R knee AROM 5-90 deg for ease with ambulation. At eval: 10-85 deg  Current: Progressing, 6-117 degrees. 5/26/17  Long term goals: to be accomplished in 6 weeks  1) Pt's FOTO score will improve > or = 53 Indicating improvements in function. At eval: FOTO = 30  Current: Progressing, 34, an increase of 4 since IE. 5/22/17  2) Pt will improve R knee AROM 0-115 deg for ease with ambulation. At eval: 10-85 deg  Current: Progressing, R knee AROM 6-110 degrees. 5/24/17  3) Pt will improve R LE MMT > or = 4/5 for functional strength during ambulation. At eval: hip flex 3+/5, hip abd 3+/5, knee ext 3-/5, knee flex 3+/5  4) Pt will demo good quad set indicating good quad strength during ambulation. At eval: pt presents with poor quad set. Current: Progressing, fair quad set. 5/17/17  5) Pt will ambulate 500' with LRAD and normal gait for safe return to work duties.   At eval: pt ambulates with 2 axillary crutches and antalgic gait on R    PLAN  [x]  Upgrade activities as tolerated     [x]  Continue plan of care  []  Update interventions per flow sheet       []  Discharge due to:_  []  Other:_      Christopher President, PT 5/26/2017  2:57 PM    Future Appointments  Date Time Provider Bernabe Garcia   5/30/2017 11:00 AM [facility] PTA [facility]   5/30/2017 1:30 PM RELL Denton [address]   [facility]2017 [facility]

## 2017-05-30 ENCOUNTER — OFFICE VISIT (OUTPATIENT)
Dept: ORTHOPEDIC SURGERY | Age: 47
End: 2017-05-30

## 2017-05-30 ENCOUNTER — APPOINTMENT (OUTPATIENT)
Dept: PHYSICAL THERAPY | Age: 47
End: 2017-05-30
Payer: MEDICAID

## 2017-05-30 ENCOUNTER — HOSPITAL ENCOUNTER (OUTPATIENT)
Dept: PHYSICAL THERAPY | Age: 47
Discharge: HOME | End: 2017-05-30
Payer: MEDICAID

## 2017-05-30 VITALS
DIASTOLIC BLOOD PRESSURE: 63 MMHG | HEIGHT: 62 IN | TEMPERATURE: 98.2 F | BODY MASS INDEX: 46.19 KG/M2 | HEART RATE: 98 BPM | SYSTOLIC BLOOD PRESSURE: 107 MMHG | WEIGHT: 251 LBS

## 2017-05-30 DIAGNOSIS — M17.11 PRIMARY OSTEOARTHRITIS OF RIGHT KNEE: Primary | ICD-10-CM

## 2017-05-30 PROCEDURE — 97140 MANUAL THERAPY 1/> REGIONS: CPT

## 2017-05-30 PROCEDURE — 97110 THERAPEUTIC EXERCISES: CPT

## 2017-05-30 PROCEDURE — 97016 VASOPNEUMATIC DEVICE THERAPY: CPT

## 2017-05-30 PROCEDURE — 97116 GAIT TRAINING THERAPY: CPT

## 2017-05-30 NOTE — PROGRESS NOTES
PT DAILY TREATMENT NOTE     Patient Name: Cristofer Merrill  Date:2017  : 1970  [x]  Patient  Verified  Payor: BLUE CROSS MEDICAID / Plan: [address] [place] [address] / Product Type: Managed Care Medicaid /    In time:10:26  Out time:11:24  Total Treatment Time (min): 58  Visit #: 9 of 18    Treatment Area: Right knee pain [M25.561]  Other tear of medial meniscus, current injury, right knee, initial encounter [S83.241A]  Other tear of lateral meniscus, current injury, right knee, initial encounter [S83.281A]    SUBJECTIVE  Pain Level (0-10 scale): 4  Any medication changes, allergies to medications, adverse drug reactions, diagnosis change, or new procedure performed?: [x] No    [] Yes (see summary sheet for update)  Subjective functional status/changes:   [] No changes reported  Pt reports that she is going to the doctor today. Pt reports she has been trying to walk more without her crutch. OBJECTIVE    Modality rationale: decrease pain to improve the patients ability to decrease difficulty while performing tasks.     Min Type Additional Details    [] Estim:  []Unatt       []IFC  []Premod                        []Other:  []w/ice   []w/heat  Position:  Location:    [] Estim: []Att    []TENS instruct  []NMES                    []Other:  []w/US   []w/ice   []w/heat  Position:  Location:    []  Traction: [] Cervical       []Lumbar                       [] Prone          []Supine                       []Intermittent   []Continuous Lbs:  [] before manual  [] after manual    []  Ultrasound: []Continuous   [] Pulsed                           []1MHz   []3MHz W/cm2:  Location:    []  Iontophoresis with dexamethasone         Location: [] Take home patch   [] In clinic    []  Ice     []  heat  []  Ice massage  []  Laser   []  Anodyne Position:  Location:    []  Laser with stim  []  Other:  Position:  Location:   10 [x]  Vasopneumatic Device Pressure:       [] lo [x] med [] hi   Temperature: [x] lo [] med [] hi   [] Skin assessment post-treatment:  []intact []redness- no adverse reaction    []redness  adverse reaction:       32 min Therapeutic Exercise:  [x] See flow sheet :   Rationale: increase ROM and increase strength to improve the patients ability to increase tolerance to activities. 8 min Manual Therapy:  Per flow sheet   Rationale: decrease pain, increase ROM, increase tissue extensibility and decrease trigger points to increase ease with ADLs. 8 min Gait Trainin feet with no device on level surfaces with SBA level of assist   Rationale:increase normal gait pattern and increase safety with ambulation. With   [] TE   [] TA   [] neuro   [] other: Patient Education: [x] Review HEP    [] Progressed/Changed HEP based on:   [] positioning   [] body mechanics   [] transfers   [] heat/ice application    [] other:      Other Objective/Functional Measures: Pt is able to ambulate 80 ft without AD with antalgic gait. Pain Level (0-10 scale) post treatment: 4    ASSESSMENT/Changes in Function: Continue per POC. Patient will continue to benefit from skilled PT services to modify and progress therapeutic interventions, address functional mobility deficits, address ROM deficits, address strength deficits and analyze and address soft tissue restrictions to attain remaining goals. []  See Plan of Care  []  See progress note/recertification  []  See Discharge Summary         Progress towards goals / Updated goals:  Short term goals: to be accomplished in 2 weeks  1) Pt will report (I) and compliance with HEP for home management of symptoms. At eval: Instructed, demonstrated, and performed HEP  Current: met per pt repot 17  2) Pt will improve R knee AROM 5-90 deg for ease with ambulation. At eval: 10-85 deg  Current: Progressing, 6-117 degrees.  17  Long term goals: to be accomplished in 6 weeks  1) Pt's FOTO score will improve > or = 53 Indicating improvements in function. At eval: FOTO = 30  Current: Progressing, 34, an increase of 4 since IE. 5/22/17  2) Pt will improve R knee AROM 0-115 deg for ease with ambulation. At eval: 10-85 deg  Current: Progressing, R knee AROM 6-110 degrees. 5/24/17  3) Pt will improve R LE MMT > or = 4/5 for functional strength during ambulation. At eval: hip flex 3+/5, hip abd 3+/5, knee ext 3-/5, knee flex 3+/5  4) Pt will demo good quad set indicating good quad strength during ambulation. At eval: pt presents with poor quad set. Current: Progressing, fair quad set. 5/17/17  5) Pt will ambulate 500' with LRAD and normal gait for safe return to work duties. At eval: pt ambulates with 2 axillary crutches and antalgic gait on R  Current: Progressing; Pt is able to ambulate 80 ft without AD with antalgic gait.  5/30/17    PLAN  []  Upgrade activities as tolerated     [x]  Continue plan of care  []  Update interventions per flow sheet       []  Discharge due to:_  []  Other:_      Batsheva Matamoros PTA 5/30/2017  10:24 AM    Future Appointments  Date Time Provider Bernabe Garcia   5/30/2017 11:00 AM Batsheva Matamoros PTA [facility]   5/30/2017 1:30 PM RELL Marcano [facility] [address]   5/31/2017 9:00 AM Batsheva Matamoros PTA [facility]

## 2017-05-30 NOTE — PROGRESS NOTES
Patient: Jef Hernandez  YOB: 1970       HISTORY:  The patient presents for reevaluation of her right knee status post arthroscopic partial medial and lateral menisectomy and chondroplasty on 4/14/17. Patient states pain is a 5 out of 10.  she has gone to physical therapy feels like she is not making any improvements. She is still on one crutch today. Patient denies any fever, chills, chest pain, shortness of breath or calf pain. There are no new illness or injuries to report since last seen in the office. PHYSICAL EXAMINATION:    Visit Vitals    /63    Pulse 98    Temp 98.2 °F (36.8 °C) (Oral)    Ht 5' 2\" (1.575 m)    Wt 251 lb (113.9 kg)    BMI 45.91 kg/m2     The patient is a well-developed, well-nourished female in no acute distress. The patient is alert and oriented times three. The patient appears to be well groomed. Mood and affect are normal.   ORTHOPEDIC EXAM of Right knee: Inspection: Effusion present,  incisions well healed  TTP: medial joint line  Range of motion: 0-120 flexion  Stability: Stable  Strength: 5/5  2+ distal pulses    IMPRESSION:  Status post Right knee arthroscopic partial medial and lateral menisectomy and chondroplasty secondary to degenerative arthritis with joint space narrowing right knee    PLAN:   1. Patient continues to have problems on a day to day basis  2. Recommended euflexxa injections as her next step  3. Patient would like to go back to Dr. Gabby Dumont to discuss a TKA  4.  Will authorize Norris billingsley inccarly conservative treatment is preferred at her apt with [facility]

## 2017-05-31 ENCOUNTER — APPOINTMENT (OUTPATIENT)
Dept: PHYSICAL THERAPY | Age: 47
End: 2017-05-31
Payer: MEDICAID

## 2017-06-01 ENCOUNTER — APPOINTMENT (OUTPATIENT)
Dept: PHYSICAL THERAPY | Age: 47
End: 2017-06-01

## 2017-06-09 ENCOUNTER — HOSPITAL ENCOUNTER (OUTPATIENT)
Dept: LAB | Age: 47
Discharge: HOME | End: 2017-06-09

## 2017-06-09 ENCOUNTER — OFFICE VISIT (OUTPATIENT)
Dept: ORTHOPEDIC SURGERY | Age: 47
End: 2017-06-09

## 2017-06-09 VITALS
SYSTOLIC BLOOD PRESSURE: 114 MMHG | TEMPERATURE: 97 F | HEART RATE: 84 BPM | BODY MASS INDEX: 46.38 KG/M2 | HEIGHT: 62 IN | WEIGHT: 252 LBS | DIASTOLIC BLOOD PRESSURE: 75 MMHG

## 2017-06-09 DIAGNOSIS — M17.11 PRIMARY OSTEOARTHRITIS OF RIGHT KNEE: Primary | ICD-10-CM

## 2017-06-09 DIAGNOSIS — E66.01 MORBID OBESITY, UNSPECIFIED OBESITY TYPE (HCC): ICD-10-CM

## 2017-06-09 DIAGNOSIS — M17.11 PRIMARY OSTEOARTHRITIS OF RIGHT KNEE: ICD-10-CM

## 2017-06-09 PROCEDURE — 99001 SPECIMEN HANDLING PT-LAB: CPT | Performed by: ORTHOPAEDIC SURGERY

## 2017-06-09 NOTE — MR AVS SNAPSHOT
Visit Information Date & Time Provider Department Dept. Phone Encounter #  
 6/9/2017 10:00 AM Roby Beatty, 27 [facility] [phone] Upcoming Health Maintenance Date Due DTaP/Tdap/Td series (1 - Tdap) 4/18/1992 PAP AKA CERVICAL CYTOLOGY 4/18/1992 INFLUENZA AGE 9 TO ADULT 8/1/2017 Allergies as of 6/9/2017  Review Complete On: 6/9/2017 By: Roby Beatty MD  
 No Known Allergies Current Immunizations  Never Reviewed No immunizations on file. Not reviewed this visit You Were Diagnosed With   
  
 Codes Comments Primary osteoarthritis of right knee    -  Primary ICD-10-CM: M17.11 ICD-9-CM: 715.16 Morbid obesity, unspecified obesity type     ICD-10-CM: E66.01 
ICD-9-CM: 278.01 Vitals BP Pulse Temp Height(growth percentile) Weight(growth percentile) BMI  
 114/75 84 97 °F (36.1 °C) (Oral) 5' 2\" (1.575 m) 252 lb (114.3 kg) 46.09 kg/m2 OB Status Smoking Status IUD Never Smoker BMI and BSA Data Body Mass Index Body Surface Area 46.09 kg/m 2 2.24 m 2 Preferred Pharmacy Pharmacy Name Phone [facility] [address] [facility] [phone] Your Updated Medication List  
  
   
This list is accurate as of: 6/9/17 11:18 AM.  Always use your most recent med list.  
  
  
  
  
 calcium-vitamin D 500 mg(1,250mg) -200 unit per tablet Commonly known as:  OYSTER SHELL Take 1 Tab by mouth daily. diclofenac-miSOPROStol  mg-mcg per tablet Commonly known as:  ARTHROTEC 75 Take 1 Tab by mouth two (2) times a day. HYDROcodone-acetaminophen 7.5-325 mg per tablet Commonly known as:  Jeevan Brittle Take 1-2 Tabs by mouth every four (4) hours as needed for Pain. Max Daily Amount: 12 Tabs. Do not take until after surgery  
  
 ibuprofen 800 mg tablet Commonly known as:  MOTRIN Take  by mouth. levothyroxine 150 mcg tablet Commonly known as:  SYNTHROID Take 150 mcg by mouth daily. metFORMIN 1,000 mg tablet Commonly known as:  GLUCOPHAGE Take 1,000 mg by mouth daily. metoprolol tartrate 25 mg tablet Commonly known as:  LOPRESSOR Take 25 mg by mouth daily. MIRENA 20 mcg/24 hr (5 years) IUD Generic drug:  levonorgestrel 1 Each by IntraUTERine route once. We Performed the Following AMB POC XRAY, KNEE; COMPLETE, 4+ VIEW [53814 CPT(R)] REFERRAL TO BARIATRICS [ETC913 CPT(R)] Comments:  
 Referral to bariatrics for evaluation and treatment. RHEUMATOID FACTOR AB, IGM, IGA, IGG [[id number] Custom] To-Do List   
 06/09/2017 Lab:  FATUMA COMPREHENSIVE PANEL   
  
 06/09/2017 Lab:  C REACTIVE PROTEIN, QT   
  
 06/09/2017 Lab:  CBC WITH AUTOMATED DIFF   
  
 06/09/2017 Lab:  INTERLEUKIN 6   
  
 06/09/2017 Lab:  LYME AB, IGG & IGM BY WB   
  
 06/09/2017 Lab:  SED RATE (ESR)   
  
 06/09/2017 Lab:  URIC ACID Referral Information Referral ID Referred By Referred To  
  
 [id number] Nadia Rivera MD   
   [id number] [place] [address] Phone: [phone] Fax: [phone] Visits Status Start Date End Date 1 New Request 6/9/17 6/9/18 If your referral has a status of pending review or denied, additional information will be sent to support the outcome of this decision. Patient Instructions The doctor has ordered some laboratory studies for you. Please go to [facility] or [facility] to have your lab tests conducted. If you wish to go to another facility that is okay. Please have the lab forward a copy of the results to our office. Knee Arthritis: Care Instructions Your Care Instructions Knee arthritis is a breakdown of the cartilage that cushions your knee joint. When the cartilage wears down, your bones rub against each other. This causes pain and stiffness. Knee arthritis tends to get worse with time. Treatment for knee arthritis involves reducing pain, making the leg muscles stronger, and staying at a healthy body weight. The treatment usually does not improve the health of the cartilage, but it can reduce pain and improve how well your knee works. You can take simple measures to protect your knee joints, ease your pain, and help you stay active. Follow-up care is a key part of your treatment and safety. Be sure to make and go to all appointments, and call your doctor if you are having problems. It's also a good idea to know your test results and keep a list of the medicines you take. How can you care for yourself at home? · Know that knee arthritis will cause more pain on some days than on others. · Stay at a healthy weight. Lose weight if you are overweight. When you stand up, the pressure on your knees from every pound of body weight is multiplied four times. So if you lose 10 pounds, you will reduce the pressure on your knees by 40 pounds. · Talk to your doctor or physical therapist about exercises that will help ease joint pain. ¨ Stretch to help prevent stiffness and to prevent injury before you exercise. You may enjoy gentle forms of yoga to help keep your knee joints and muscles flexible. ¨ Walk instead of jog. ¨ Ride a bike. This makes your thigh muscles stronger and takes pressure off your knee. ¨ Wear well-fitting and comfortable shoes. ¨ Exercise in chest-deep water. This can help you exercise longer with less pain. ¨ Avoid exercises that include squatting or kneeling. They can put a lot of strain on your knees. ¨ Talk to your doctor to make sure that the exercise you do is not making the arthritis worse. · Do not sit for long periods of time. Try to walk once in a while to keep your knee from getting stiff. · Ask your doctor or physical therapist whether shoe inserts may reduce your arthritis pain. · If you can afford it, get new athletic shoes at least every year. This can help reduce the strain on your knees. · Use a device to help you do everyday activities. ¨ A cane or walking stick can help you keep your balance when you walk. Hold the cane or walking stick in the hand opposite the painful knee. ¨ If you feel like you may fall when you walk, try using crutches or a front-wheeled walker. These can prevent falls that could cause more damage to your knee. ¨ A knee brace may help keep your knee stable and prevent pain. ¨ You also can use other things to make life easier, such as a higher toilet seat and handrails in the bathtub or shower. · Take pain medicines exactly as directed. ¨ Do not wait until you are in severe pain. You will get better results if you take it sooner. ¨ If you are not taking a prescription pain medicine, take an over-the-counter medicine such as acetaminophen (Tylenol), ibuprofen (Advil, Motrin), or naproxen (Aleve). Read and follow all instructions on the label. ¨ Do not take two or more pain medicines at the same time unless the doctor told you to. Many pain medicines have acetaminophen, which is Tylenol. Too much acetaminophen (Tylenol) can be harmful. ¨ Tell your doctor if you take a blood thinner, have diabetes, or have allergies to shellfish. · Ask your doctor if you might benefit from a shot of steroid medicine into your knee. This may provide pain relief for several months. · Many people take the supplements glucosamine and chondroitin for osteoarthritis. Some people feel they help, but the medical research does not show that they work. Talk to your doctor before you take these supplements. When should you call for help? Call your doctor now or seek immediate medical care if: 
· You have sudden swelling, warmth, or pain in your knee. · You have knee pain and a fever or rash. · You have such bad pain that you cannot use your knee. Watch closely for changes in your health, and be sure to contact your doctor if you have any problems. Where can you learn more? Go to http://jerilyn-erin.info/. Enter S312 in the search box to learn more about \"Knee Arthritis: Care Instructions. \" Current as of: October 31, 2016 Content Version: 11.2 © 4911-4430 [employer]. Care instructions adapted under license by [employer] (which disclaims liability or warranty for this information). If you have questions about a medical condition or this instruction, always ask your healthcare professional. [address] any warranty or liability for your use of this information. Introducing [facility]! Malika Sharma introduces Fitmoo patient portal. Now you can access parts of your medical record, email your doctor's office, and request medication refills online. 1. In your internet browser, go to [email or web address] 2. Click on the First Time User? Click Here link in the Sign In box. You will see the New Member Sign Up page. 3. Enter your Fitmoo Access Code exactly as it appears below. You will not need to use this code after youve completed the sign-up process. If you do not sign up before the expiration date, you must request a new code. · Fitmoo Access Code: [email or web address] Expires: 6/13/2017  1:04 PM 
 
4. Enter the last four digits of your Social Security Number (xxxx) and Date of Birth (mm/dd/yyyy) as indicated and click Submit. You will be taken to the next sign-up page. 5. Create a Fitmoo ID. This will be your Fitmoo login ID and cannot be changed, so think of one that is secure and easy to remember. 6. Create a Fitmoo password. You can change your password at any time. 7. Enter your Password Reset Question and Answer.  This can be used at a later time if you forget your password. 8. Enter your e-mail address. You will receive e-mail notification when new information is available in [address] 9. Click Sign Up. You can now view and download portions of your medical record. 10. Click the Download Summary menu link to download a portable copy of your medical information. If you have questions, please visit the Frequently Asked Questions section of the myContactCard website. Remember, myContactCard is NOT to be used for urgent needs. For medical emergencies, dial 911. Now available from your iPhone and Android! Please provide this summary of care documentation to your next provider. Lyme Disease Testing Disclaimer:   
 § 18.0-9042.1. (Expires July 1, 2018) Lyme disease testing information disclosure. A. Every licensee or his in-office designee who orders a laboratory test for the presence of Lyme disease shall provide to the patient or his legal representative the following written information: \"ACCORDING TO THE CENTERS FOR DISEASE CONTROL AND PREVENTION, AS OF 2011 LYME DISEASE IS THE SIXTH FASTEST GROWING DISEASE IN THE [place]. YOUR HEALTH CARE PROVIDER HAS ORDERED A LABORATORY TEST FOR THE PRESENCE OF LYME DISEASE FOR YOU. CURRENT LABORATORY TESTING FOR LYME DISEASE CAN BE PROBLEMATIC AND STANDARD LABORATORY TESTS OFTEN RESULT IN FALSE NEGATIVE AND FALSE POSITIVE RESULTS, AND IF DONE TOO EARLY, YOU MAY NOT HAVE PRODUCED ENOUGH ANTIBODIES TO BE CONSIDERED POSITIVE BECAUSE YOUR IMMUNE RESPONSE REQUIRES TIME TO DEVELOP ANTIBODIES. IF YOU ARE TESTED FOR LYME DISEASE, AND THE RESULTS ARE NEGATIVE, THIS DOES NOT NECESSARILY MEAN YOU DO NOT HAVE LYME DISEASE. IF YOU CONTINUE TO EXPERIENCE SYMPTOMS, YOU SHOULD CONTACT YOUR HEALTH CARE PROVIDER AND INQUIRE ABOUT THE APPROPRIATENESS OF RETESTING OR ADDITIONAL TREATMENT. \"  
B.  Licensees shall be immune from civil liability for the provision of the written information required by this section absent gross negligence or willful misconduct. Your primary care clinician is listed as Jaron Trevizo. If you have any questions after today's visit, please call [phone].

## 2017-06-09 NOTE — PATIENT INSTRUCTIONS
The doctor has ordered some laboratory studies for you. Please go to [facility] or [facility] to have your lab tests conducted. If you wish to go to another facility that is okay. Please have the lab forward a copy of the results to our office. Knee Arthritis: Care Instructions  Your Care Instructions  Knee arthritis is a breakdown of the cartilage that cushions your knee joint. When the cartilage wears down, your bones rub against each other. This causes pain and stiffness. Knee arthritis tends to get worse with time. Treatment for knee arthritis involves reducing pain, making the leg muscles stronger, and staying at a healthy body weight. The treatment usually does not improve the health of the cartilage, but it can reduce pain and improve how well your knee works. You can take simple measures to protect your knee joints, ease your pain, and help you stay active. Follow-up care is a key part of your treatment and safety. Be sure to make and go to all appointments, and call your doctor if you are having problems. It's also a good idea to know your test results and keep a list of the medicines you take. How can you care for yourself at home? · Know that knee arthritis will cause more pain on some days than on others. · Stay at a healthy weight. Lose weight if you are overweight. When you stand up, the pressure on your knees from every pound of body weight is multiplied four times. So if you lose 10 pounds, you will reduce the pressure on your knees by 40 pounds. · Talk to your doctor or physical therapist about exercises that will help ease joint pain. ¨ Stretch to help prevent stiffness and to prevent injury before you exercise. You may enjoy gentle forms of yoga to help keep your knee joints and muscles flexible. ¨ Walk instead of jog. ¨ Ride a bike. This makes your thigh muscles stronger and takes pressure off your knee.   ¨ Wear well-fitting and comfortable shoes.  ¨ Exercise in chest-deep water. This can help you exercise longer with less pain. ¨ Avoid exercises that include squatting or kneeling. They can put a lot of strain on your knees. ¨ Talk to your doctor to make sure that the exercise you do is not making the arthritis worse. · Do not sit for long periods of time. Try to walk once in a while to keep your knee from getting stiff. · Ask your doctor or physical therapist whether shoe inserts may reduce your arthritis pain. · If you can afford it, get new athletic shoes at least every year. This can help reduce the strain on your knees. · Use a device to help you do everyday activities. ¨ A cane or walking stick can help you keep your balance when you walk. Hold the cane or walking stick in the hand opposite the painful knee. ¨ If you feel like you may fall when you walk, try using crutches or a front-wheeled walker. These can prevent falls that could cause more damage to your knee. ¨ A knee brace may help keep your knee stable and prevent pain. ¨ You also can use other things to make life easier, such as a higher toilet seat and handrails in the bathtub or shower. · Take pain medicines exactly as directed. ¨ Do not wait until you are in severe pain. You will get better results if you take it sooner. ¨ If you are not taking a prescription pain medicine, take an over-the-counter medicine such as acetaminophen (Tylenol), ibuprofen (Advil, Motrin), or naproxen (Aleve). Read and follow all instructions on the label. ¨ Do not take two or more pain medicines at the same time unless the doctor told you to. Many pain medicines have acetaminophen, which is Tylenol. Too much acetaminophen (Tylenol) can be harmful. ¨ Tell your doctor if you take a blood thinner, have diabetes, or have allergies to shellfish. · Ask your doctor if you might benefit from a shot of steroid medicine into your knee. This may provide pain relief for several months.   · Many people take the supplements glucosamine and chondroitin for osteoarthritis. Some people feel they help, but the medical research does not show that they work. Talk to your doctor before you take these supplements. When should you call for help? Call your doctor now or seek immediate medical care if:  · You have sudden swelling, warmth, or pain in your knee. · You have knee pain and a fever or rash. · You have such bad pain that you cannot use your knee. Watch closely for changes in your health, and be sure to contact your doctor if you have any problems. Where can you learn more? Go to http://jerilyn-erin.info/. Enter O664 in the search box to learn more about \"Knee Arthritis: Care Instructions. \"  Current as of: October 31, 2016  Content Version: 11.2  © 5865-0419 [employer]. Care instructions adapted under license by [employer] (which disclaims liability or warranty for this information). If you have questions about a medical condition or this instruction, always ask your healthcare professional. [address] any warranty or liability for your use of this information.

## 2017-06-09 NOTE — LETTER
NOTIFICATION RETURN TO WORK / SCHOOL 
 
6/9/2017 11:16 AM 
 
Ms. Tamera Grace [id number] [facility] [address] Keely [place] [address] To Whom It May Concern: 
 
Tamera Grace is currently under the care of [address] [place] [address] Kailash Greene. She will need sedentary job duties; if unable to accommodate sedentary desk duties then she needs to be out of work until further notice. If there are questions or concerns please have the patient contact our office. Sincerely, Gideon Camacho MD

## 2017-06-09 NOTE — PROGRESS NOTES
Patient: Adilia Omalley                MRN: [id number]       SSN: [id number]  YOB: 1970        AGE: 56 y.o. SEX: female  Body mass index is 46.09 kg/(m^2). PCP: Chayo Tripathi MD  06/09/17    HISTORY: Ms. Sharon Aguirre is here today. She has an arthroscopy six weeks ago. 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 She had meniscal tears and had an arthroscopy and chondroplasty, and partial meniscectomy as well. She is having a lot of pain and stiffness. Apparently, there was a history of rheumatoid arthritis when she was younger and has not followed up with a rheumatologist in quite some time. The pain can be severe. She denies calf pain and denies shortness of breath or chest pain. PHYSICAL EXAMINATION:  On examination today, her BMI is 46. She is pleasant. The incision is nicely healed. The calf is nontender. Elliot's sign is negative. The hips rotate adequately. She has a mildly antalgic gait. She is using her crutch. Her range of motion passively is about -4° to about 90°, and I have asked her to sit with the leg out straight and also to bend the knee as well on her own. The incisions appear to be well-healed. There does not appear to be any evidence for infection or DVT. RADIOGRAPHS:  Review of her x-rays, AP, tunnel, lateral, and skyline, confirms moderately advanced arthritis, really of both knees. PLAN:  At this point, I would recommend some infectious and rheumatoid labs. Assuming they are negative, she can have some cortisone in the knee. She does need to have some weight loss prior to total knee replacement. Her BMI needs to be 40 or less. We will certainly try to keep her comfortable during that period of time. Having said this, we will see her back in a couple of weeks after the blood tests and will consider intra-articular injection at that point.            REVIEW OF SYSTEMS:      CON: negative for weight loss, fever  EYE: negative for double vision  ENT: negative for hoarseness  RS:   negative for Tb  GI:    negative for blood in stool  :  negative for blood in urine  Other systems reviewed and noted below. Past Medical History:   Diagnosis Date    Diabetes ([address]     Graves disease     Tachycardia     due to Graves Disease, on Metoprolol       History reviewed. No pertinent family history. Current Outpatient Prescriptions   Medication Sig Dispense Refill    ibuprofen (MOTRIN) 800 mg tablet Take  by mouth.  levonorgestrel (MIRENA) 20 mcg/24 hr (5 years) IUD 1 Each by IntraUTERine route once.  levothyroxine (SYNTHROID) 150 mcg tablet Take 150 mcg by mouth daily.  metoprolol tartrate (LOPRESSOR) 25 mg tablet Take 25 mg by mouth daily.  metFORMIN (GLUCOPHAGE) 1,000 mg tablet Take 1,000 mg by mouth daily.  calcium-vitamin D (OYSTER SHELL) 500 mg(1,250mg) -200 unit per tablet Take 1 Tab by mouth daily.  diclofenac-miSOPROStol (ARTHROTEC 75)  mg-mcg per tablet Take 1 Tab by mouth two (2) times a day. 60 Tab 0    HYDROcodone-acetaminophen (NORCO) 7.5-325 mg per tablet Take 1-2 Tabs by mouth every four (4) hours as needed for Pain. Max Daily Amount: 12 Tabs. Do not take until after surgery 40 Tab 0       No Known Allergies    Past Surgical History:   Procedure Laterality Date    HX KNEE ARTHROSCOPY      HX THYROIDECTOMY  05/13/2016       Social History     Social History    Marital status: SINGLE     Spouse name: N/A    Number of children: N/A    Years of education: N/A     Occupational History    Not on file.      Social History Main Topics    Smoking status: Never Smoker    Smokeless tobacco: Never Used    Alcohol use No    Drug use: No    Sexual activity: Not on file     Other Topics Concern    Not on file     Social History Narrative       Visit Vitals    /75    Pulse 84    Temp 97 °F (36.1 °C) (Oral)    Ht 5' 2\" (1.575 m)    Wt 252 lb (114.3 kg)    BMI 46.09 kg/m2         PHYSICAL EXAMINATION:  GENERAL: Alert and oriented x3, in no acute distress, well-developed, well-nourished, afebrile. HEART: No JVD. EYES: No scleral icterus   NECK: No significant lymphadenopathy   LUNGS: No respiratory compromise or indrawing  ABDOMEN: Soft, non-tender, non-distended. Electronically signed by:  Corin Stuart MD

## 2017-06-13 NOTE — PROGRESS NOTES
[facility]              [address]        [place], [address]   [phone] fax    Discharge Summary  Patient name: Junior Luo Start of Care: 17   Referral source: Jenni Dubose,* : 1970   Medical/Treatment Diagnosis: Right knee pain [M25.561]  Other tear of medial meniscus, current injury, right knee, initial encounter [S83.241A]  Other tear of lateral meniscus, current injury, right knee, initial encounter [S83.281A] Onset Date:17     Prior Hospitalization: see medical history Provider#: [id number]   Medications: Verified on Patient Summary List    Comorbidities: Arthritis, BMI over 30, DM, GI Disease, HA's, Previous accidents, Prior sx  Prior Level of Function: 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 Visits from Start of Care: 9    Missed Visits: 4  Reporting Period : 17 to 17    Summary of Care:  Progress towards goals / Updated goals:  Short term goals: to be accomplished in 2 weeks  1) Pt will report (I) and compliance with HEP for home management of symptoms. At eval: Instructed, demonstrated, and performed HEP  Current: met per pt reports  2) Pt will improve R knee AROM 5-90 deg for ease with ambulation. At eval: 10-85 deg  Current: Progressing, 6-117 degrees  Long term goals: to be accomplished in 6 weeks  1) Pt's FOTO score will improve > or = 53 Indicating improvements in function. At eval: FOTO = 30  Current: Progressing, 34, an increase of 4 since IE  2) Pt will improve R knee AROM 0-115 deg for ease with ambulation. At eval: 10-85 deg  Current: Progressing, R knee AROM 6-110 degrees  3) Pt will improve R LE MMT > or = 4/5 for functional strength during ambulation.   At eval: hip flex 3+/5, hip abd 3+/5, knee ext 3-/5, knee flex 3+/5  Current: not formally reassessed prior to DC by physician  4) Pt will demo good quad set indicating good quad strength during ambulation. At eval: pt presents with poor quad set. Current: Progressing, fair quad set  5) Pt will ambulate 500' with LRAD and normal gait for safe return to work duties. At eval: pt ambulates with 2 axillary crutches and antalgic gait on R  Current: Progressing; Pt is able to ambulate 80 ft without AD with antalgic gait    Pt returned to MD and was told to DC PT at this time due to minimal progress with PT at this time. Reports she is being referred for a TKR consultation. DC to HEP at this time. ASSESSMENT/RECOMMENDATIONS:  [x]Discontinue therapy: []Patient has reached or is progressing toward set goals      []Patient is non-compliant or has abdicated      [x]Due to lack of appreciable progress towards set goals    Manish Colon, PT 6/13/2017 8:45 AM     NOTE TO PHYSICIAN:  Please complete the following and fax to: [facility] at [phone]  . Retain this original for your records. If you are unable to process this request in   24 hours, please contact our office.      [] I have read the above report and request that my patient continue therapy with the following changes/special instructions:  [] I have read the above report and request that my patient be discharged from therapy    Physician's Signature:_____________________ Date:___________Time:__________

## 2017-06-15 ENCOUNTER — TELEPHONE (OUTPATIENT)
Dept: ORTHOPEDIC SURGERY | Age: 47
End: 2017-06-15

## 2017-06-15 DIAGNOSIS — M17.11 PRIMARY OSTEOARTHRITIS OF RIGHT KNEE: Primary | ICD-10-CM

## 2017-06-15 NOTE — TELEPHONE ENCOUNTER
Patient called to request an order for a motorized scooter she can use while on vacation. Patient states her insurance advised her it will be approved.   Please advise patient at [phone]

## 2017-06-20 ENCOUNTER — TELEPHONE (OUTPATIENT)
Dept: ORTHOPEDIC SURGERY | Age: 47
End: 2017-06-20

## 2017-06-20 NOTE — TELEPHONE ENCOUNTER
Called patient and explained that the letter she received was from insurance denying the Euflexxa that althea had authorized.  I advised her that when she comes in for her appt on 6/23 she can still receive cortisone injection

## 2017-06-20 NOTE — TELEPHONE ENCOUNTER
Ms. Sheyla Mccann is calling as she received a letter from her insurance company denying the request for the cortisone injection request for appt 06/23/17 as not being medically necessary based on not enough medical information from Dr. Alvarado Ax appt of 06/09/17. Patient stated the ins company did sent letter to the physician. Patient is upset over the letter and the injecton being denied. Please call her insurance as she has appt on Fri 06/23/17 to get cortisone injection as per notes of 06/09/17. Please call her back at [phone].

## 2017-06-23 ENCOUNTER — OFFICE VISIT (OUTPATIENT)
Dept: ORTHOPEDIC SURGERY | Age: 47
End: 2017-06-23

## 2017-06-23 VITALS
WEIGHT: 251 LBS | HEIGHT: 62 IN | HEART RATE: 101 BPM | DIASTOLIC BLOOD PRESSURE: 81 MMHG | BODY MASS INDEX: 46.19 KG/M2 | SYSTOLIC BLOOD PRESSURE: 134 MMHG

## 2017-06-23 DIAGNOSIS — M17.11 PRIMARY OSTEOARTHRITIS OF RIGHT KNEE: Primary | ICD-10-CM

## 2017-06-23 RX ORDER — BETAMETHASONE SODIUM PHOSPHATE AND BETAMETHASONE ACETATE 3; 3 MG/ML; MG/ML
6 INJECTION, SUSPENSION INTRA-ARTICULAR; INTRALESIONAL; INTRAMUSCULAR; SOFT TISSUE ONCE
Qty: 1 ML | Refills: 0
Start: 2017-06-23 | End: 2017-06-23

## 2017-06-23 NOTE — PROGRESS NOTES
[address], [address]  [phone]           Patient: Jw Carson                MRN: [id number]       SSN: [id number]  YOB: 1970        AGE: 56 y.o. SEX: female  Body mass index is 45.9 kg/(m^2). PCP: Luisa Esteban MD  06/23/17      This office note has been dictated. REVIEW OF SYSTEMS:  Constitutional: Negative for fever, chills, weight loss and malaise/fatigue. HENT: Negative. Eyes: Negative. Respiratory: Negative. Cardiovascular: Negative. Gastrointestinal: No bowel incontinence or constipation. Genitourinary: No bladder incontinence or saddle anesthesia. Skin: Negative. Neurological: Negative. Endo/Heme/Allergies: Negative. Psychiatric/Behavioral: Negative. Musculoskeletal: As per HPI above. Past Medical History:   Diagnosis Date    [facility] ([facility]     Graves disease     Tachycardia     due to Graves Disease, on Metoprolol         Current Outpatient Prescriptions:     ibuprofen (MOTRIN) 800 mg tablet, Take  by mouth., Disp: , Rfl:     levonorgestrel (MIRENA) 20 mcg/24 hr (5 years) IUD, 1 Each by IntraUTERine route once., Disp: , Rfl:     levothyroxine (SYNTHROID) 150 mcg tablet, Take 150 mcg by mouth daily. , Disp: , Rfl:     metoprolol tartrate (LOPRESSOR) 25 mg tablet, Take 25 mg by mouth daily. , Disp: , Rfl:     metFORMIN (GLUCOPHAGE) 1,000 mg tablet, Take 1,000 mg by mouth daily. , Disp: , Rfl:     calcium-vitamin D (OYSTER SHELL) 500 mg(1,250mg) -200 unit per tablet, Take 1 Tab by mouth daily. , Disp: , Rfl:     diclofenac-miSOPROStol (ARTHROTEC 75)  mg-mcg per tablet, Take 1 Tab by mouth two (2) times a day., Disp: 60 Tab, Rfl: 0    HYDROcodone-acetaminophen (NORCO) 7.5-325 mg per tablet, Take 1-2 Tabs by mouth every four (4) hours as needed for Pain. Max Daily Amount: 12 Tabs.  Do not take until after surgery, Disp: 40 Tab, Rfl: 0    No Known Allergies    Social History     Social History    Marital status: SINGLE     Spouse name: N/A    Number of children: N/A    Years of education: N/A     Occupational History    Not on file. Social History Main Topics    Smoking status: Never Smoker    Smokeless tobacco: Never Used    Alcohol use No    Drug use: No    Sexual activity: Not on file     Other Topics Concern    Not on file     Social History Narrative       Past Surgical History:   Procedure Laterality Date    HX KNEE ARTHROSCOPY      HX THYROIDECTOMY  05/13/2016           * Patient was identified by name and date of birth   * Agreement on procedure being performed was verified  * Risks and Benefits explained to the patient  * Procedure site verified and marked as necessary  * Patient was positioned for comfort  * Consent was signed and verified  4:25 PM    The patient was instructed on post injection care. We did see Ms. Yvon Sabillon for followup with regards to her right knee. She is status post arthroscopy of the right knee and was found to have grade IV changes of the knee. She did some physical therapy afterwards. However, the knee is still bothering her considerably. Dr. Maria Teresa Smith saw her last and sent her for some blood work. He also discussed weight loss with her. She returns today for reevaluation. The patient states she has discomfort, which continues morning, noon, and night. It is worse with ambulation. She has no locking or giving way currently. PHYSICAL EXAMINATION: In general, the patient is alert and oriented x 3 and is in no acute distress. The patient is well-developed and well-nourished with a normal affect. The patient is afebrile. HEENT:  Head is normocephalic and atraumatic. Pupils are equally round and reactive to light and accommodation. Extraocular eye movements are intact. Neck is supple. Trachea is midline. No JVD is present. Breathing is nonlabored.   Examination of the lower extremities reveals pain-free range of motion of the hips. There is no pain to palpation of the trochanteric bursae. There is a negative straight leg raise, negative calf tenderness, and negative Elliots sign. There are no signs of DVT present. Examination of the right knee reveals the skin is intact. There is no ecchymosis, no warmth, and no signs of infection or cellulitis present. She does have pain with palpation tricompartmentally and crepitus arising from the anterior compartment. LABORATORY STUDIES: Review of labs reveals uric acid of 5.4. Sedimentation rate of 53. IL-6 is 6.2. Sjogrens workup is negative. RA latex is less than 10. CBC white count is 5.7. Lyme titer is negative. FATUMA, DNA is less than 1. Anti-scleroderma antibodies are negative. ASSESSMENT:      1. Right knee grade IV change osteoarthritis. 2. Obesity. PLAN:  At this point, we discussed treatment options. We are going to move forward with a cortisone injection for the right knee today to try to settle things down. After informed and written consent, under aseptic conditions with ultrasound-guided assistance, the right knee was prepped with Betadine and 6 mg of Celestone was injected without complications. The patient tolerated the injection well. She was instructed on post injection care. We are going to get her into aquatic therapy. Hopefully, this will help her get her mobility going and also help with her weight loss. We will see her back in about six weeks time for evaluation.                        JR Wily THORNE, GINNY, ATC

## 2017-07-05 ENCOUNTER — DOCUMENTATION ONLY (OUTPATIENT)
Dept: ORTHOPEDIC SURGERY | Age: 47
End: 2017-07-05

## 2017-07-05 NOTE — PROGRESS NOTES
Records request received from [facility] 7-5-17, faxed to [facility] at [facility]

## 2017-07-07 RX ORDER — IBUPROFEN 800 MG/1
800 TABLET ORAL
Qty: 60 TAB | Refills: 0 | Status: SHIPPED | OUTPATIENT
Start: 2017-07-07 | End: 2020-05-29

## 2017-07-07 NOTE — TELEPHONE ENCOUNTER
Patient wishes to continue Motrin 800 vs Arthrotec. Last Visit: 05/30/2017 with RELL Amos    Next Appointment: noted to f/u prn   Previous Refill Encounters: 04/11/2017 per RELL Amos #[id number]     Requested Prescriptions     Pending Prescriptions Disp Refills    ibuprofen (MOTRIN) 800 mg tablet 60 Tab 0     Sig: Take 1 Tab by mouth three (3) times daily (with meals).  Do not take with Arthrotec

## 2017-07-10 ENCOUNTER — TELEPHONE (OUTPATIENT)
Dept: ORTHOPEDIC SURGERY | Age: 47
End: 2017-07-10

## 2017-07-10 NOTE — TELEPHONE ENCOUNTER
Called and spoke with patient. Patient was confused about the sig on the Rx for Motrin 800 that Christy Garcia had written on 7/7/17. Patient was not aware of what the Arthrotec medication was, I explained to her it was another anti-inflammatory medication and that she should not be taking the Motrin and Arthrotec together. She stated she never took the Arthrotec medication. Patient stated she had a cortisone injection on 6/23/17 by Ovidio Merrill and wanted to know if it was ok to take the Motrin since she had a cortisone injection, I advised her that this was ok. She will call back with any further questions.

## 2017-07-10 NOTE — TELEPHONE ENCOUNTER
Pt called in requesting to speak with Liudmila villeda in regards to a medication she is on. Pt can be reached at [phone].

## 2017-07-12 ENCOUNTER — TELEPHONE (OUTPATIENT)
Dept: ORTHOPEDIC SURGERY | Facility: CLINIC | Age: 47
End: 2017-07-12

## 2017-07-12 NOTE — TELEPHONE ENCOUNTER
PATIENT CALLED FOR DR. TURNER AND IS REQUESTING THAT A DETAIL[facility]. PATIENT SAID THE PREVIOUS NOTE THAT WAS SENT IS NOT ENOUGH. THAT IT CAN NOT ONLY STATE SHE IS IN PAIN. THEY NEED MORE DETAILED INFORMATION , INCLUDING THAT SHE WILL ALSO BE HAVING SURGERY. [place]   TEL. [phone]  SHE DID NOT HAVE THEIR FAX NUMBER. PATIENT TEL. [phone].

## 2017-07-12 NOTE — LETTER
NOTIFICATION RETURN TO WORK / SCHOOL 
 
7/24/2017 9:47 AM 
 
Ms. Mik Clemens [address] [place] [address] To Whom It May Concern: 
 
Mik Clemens is currently under the care of [address] [place] [address] She is unable to walk greater than 5 minutes at a time. She hast rouble with getting up from a chair, and going up and down stairs. She has trouble performing ADL's due to arthritis and ability to mobilize. Surgery is scheduled for a total joint replacement.   
    
 
 
 
If there are questions or concerns please have the patient contact our office. Sincerely, Karen Brown MD

## 2017-07-24 NOTE — TELEPHONE ENCOUNTER
Ok to provide letter    Unable to walk greater than 5 min. Trouble with getting up from chair, stairs. Trouble performing ADL's due to arthritis and ability to mobilize. Surgery is scheduled for joint replacement.

## 2017-08-04 ENCOUNTER — OFFICE VISIT (OUTPATIENT)
Dept: ORTHOPEDIC SURGERY | Age: 47
End: 2017-08-04

## 2017-08-04 VITALS
OXYGEN SATURATION: 94 % | WEIGHT: 248 LBS | SYSTOLIC BLOOD PRESSURE: 102 MMHG | TEMPERATURE: 97.8 F | BODY MASS INDEX: 45.64 KG/M2 | RESPIRATION RATE: 16 BRPM | HEART RATE: 91 BPM | DIASTOLIC BLOOD PRESSURE: 64 MMHG | HEIGHT: 62 IN

## 2017-08-04 DIAGNOSIS — M17.11 PRIMARY OSTEOARTHRITIS OF RIGHT KNEE: Primary | ICD-10-CM

## 2017-08-04 RX ORDER — IBUPROFEN 800 MG/1
800 TABLET ORAL
Qty: 90 TAB | Refills: 0 | Status: SHIPPED | OUTPATIENT
Start: 2017-08-04 | End: 2020-05-29

## 2017-08-04 RX ORDER — GABAPENTIN 100 MG/1
CAPSULE ORAL 3 TIMES DAILY
COMMUNITY
End: 2020-05-29

## 2017-08-04 RX ORDER — HYDROCODONE BITARTRATE AND ACETAMINOPHEN 7.5; 325 MG/1; MG/1
1 TABLET ORAL
Qty: 21 TAB | Refills: 0 | Status: SHIPPED | OUTPATIENT
Start: 2017-08-04 | End: 2020-05-29

## 2017-08-04 RX ORDER — DICLOFENAC SODIUM 10 MG/G
GEL TOPICAL 4 TIMES DAILY
COMMUNITY
End: 2020-05-29

## 2017-08-04 NOTE — LETTER
NOTIFICATION RETURN TO WORK / SCHOOL 
 
8/4/2017 2:11 PM 
 
Ms. Colonel Mcintyre [address] [place] [address] To Whom It May Concern: 
 
Colonel Mcintyre is currently under the care of [address] [place], [place] [address]tha [place]. Patient able to return to work to perform sedentary duty. If there are questions or concerns please have the patient contact our office.  
 
 
 
Sincerely, 
 
 
Chava Fletcher PA-C

## 2017-08-04 NOTE — PROGRESS NOTES
[address], [address] [facility]  [phone]           Patient: Massiel Santos                MRN: [id number]       SSN: [id number]  YOB: 1970        AGE: 56 y.o. SEX: female  Body mass index is 45.36 kg/(m^2). PCP: Ariana Figueroa MD  08/04/17      This office note has been dictated. REVIEW OF SYSTEMS:  Constitutional: Negative for fever, chills, weight loss and malaise/fatigue. HENT: Negative. Eyes: Negative. Respiratory: Negative. Cardiovascular: Negative. Gastrointestinal: No bowel incontinence or constipation. Genitourinary: No bladder incontinence or saddle anesthesia. Skin: Negative. Neurological: Negative. Endo/Heme/Allergies: Negative. Psychiatric/Behavioral: Negative. Musculoskeletal: As per HPI above. Past Medical History:   Diagnosis Date    [facility]     Graves disease     Tachycardia     due to Graves Disease, on Metoprolol         Current Outpatient Prescriptions:     gabapentin (NEURONTIN) 100 mg capsule, Take  by mouth three (3) times daily. , Disp: , Rfl:     diclofenac (VOLTAREN) 1 % gel, Apply  to affected area four (4) times daily. , Disp: , Rfl:     ibuprofen (MOTRIN) 800 mg tablet, Take  by mouth., Disp: , Rfl:     levothyroxine (SYNTHROID) 150 mcg tablet, Take 150 mcg by mouth daily. , Disp: , Rfl:     metoprolol tartrate (LOPRESSOR) 25 mg tablet, Take 25 mg by mouth daily. , Disp: , Rfl:     metFORMIN (GLUCOPHAGE) 1,000 mg tablet, Take 1,000 mg by mouth daily. , Disp: , Rfl:     calcium-vitamin D (OYSTER SHELL) 500 mg(1,250mg) -200 unit per tablet, Take 1 Tab by mouth daily. , Disp: , Rfl:     ibuprofen (MOTRIN) 800 mg tablet, Take 1 Tab by mouth three (3) times daily (with meals).  Do not take with Arthrotec, Disp: 60 Tab, Rfl: 0    diclofenac-miSOPROStol (ARTHROTEC 75)  mg-mcg per tablet, Take 1 Tab by mouth two (2) times a day., Disp: 60 Tab, Rfl: 0   HYDROcodone-acetaminophen (NORCO) 7.5-325 mg per tablet, Take 1-2 Tabs by mouth every four (4) hours as needed for Pain. Max Daily Amount: 12 Tabs. Do not take until after surgery, Disp: 40 Tab, Rfl: 0    levonorgestrel (MIRENA) 20 mcg/24 hr (5 years) IUD, 1 Each by IntraUTERine route once., Disp: , Rfl:     No Known Allergies    Social History     Social History    Marital status: SINGLE     Spouse name: N/A    Number of children: N/A    Years of education: N/A     Occupational History    Not on file. Social History Main Topics    Smoking status: Never Smoker    Smokeless tobacco: Never Used    Alcohol use No    Drug use: No    Sexual activity: Not on file     Other Topics Concern    Not on file     Social History Narrative       Past Surgical History:   Procedure Laterality Date    HX KNEE ARTHROSCOPY      HX THYROIDECTOMY  05/13/2016             We did see Ms. Luis Eldridge for followup with regards to her right knee. The patient does have known end-staged arthritis of the right knee, particularly patellofemoral.  She has had a scope in the past showing grade IV changes. She has had conservative treatment. She has really failed. Cortisone has not helped her. She did aquatic therapy that made her feel good while she was doing it.  however, she still has discomfort in her knee, and it is waking her up at night. She is just taking occasional Norco, as well as Ibuprofen. The patient is out of work. She is unable to go back to perform her normal job activities, as she is unable to stand for extended periods. She is unable to knee, squat, and run. No heavy lifting. She uses a crutch for ambulation. PHYSICAL EXAMINATION: In general, the patient is alert and oriented x 3 and is in no acute distress. The patient is well-developed and well-nourished with a normal affect. The patient is afebrile. HEENT:  Head is normocephalic and atraumatic.   Pupils are equally round and reactive to light and accommodation. Extraocular eye movements are intact. Neck is supple. Trachea is midline. No JVD is present. Breathing is nonlabored. Examination of the lower extremities reveals pain-free range of motion of the hips. There is no pain to palpation of the trochanteric bursae. There is a negative straight leg raise, negative calf tenderness, and negative Elliots sign. There are no signs of DVT present. Examination of the right knee reveals the skin is intact. There is no ecchymosis and no warmth. He does have crepitus with range of motion activities, which his significant. She has discomfort with patellofemoral grind. She also has pain to the medial and lateral joint lines as well. Findings are consistent with advanced arthritis of the right knee. RADIOGRAPHS:  Review of radiographs does confirm end-staged patellofemoral arthritis with advanced medial and lateral compartment arthritis as well. ASSESSMENT:  Right knee end-staged osteoarthritis. PLAN:  At this point, we would like the patient to get a little more weight off, as her BMI is currently 45, prior to moving forward with knee replacement surgery. This was discussed with the patient today in the office. She had been referred to bariatrics, which she declined. She will continue with a home diet program.  She was given a refill of her Norco, as well as a refill of Ibuprofen. She will continue out of work, as she is unable to do her normal job activities. We have given her a note for sedentary work if available. We will plan on seeing her back in the office in about three months time for evaluation for weight check.               JR Wily THORNE, GINNY, ATC

## 2017-08-29 ENCOUNTER — TELEPHONE (OUTPATIENT)
Dept: ORTHOPEDIC SURGERY | Age: 47
End: 2017-08-29

## 2017-08-29 NOTE — TELEPHONE ENCOUNTER
Pt called in requesting a note for work that is updated from the last note but states the same as the last note. Pt would like the note faxed to [facility]. Pt can be reached [phone].

## 2017-08-29 NOTE — LETTER
NOTIFICATION RETURN TO WORK / SCHOOL 
 
8/29/2017 11:25 AM 
 
Ms. Mik Clemens [address] [place] [phone] To Whom It May Concern: 
 
Mik Clemens is currently under the care of [address] [place] [address] [place]. Patient is to continue to work at sedentary duty. If there are questions or concerns please have the patient contact our office. Sincerely, Karen Brown MD

## 2017-10-20 ENCOUNTER — DOCUMENTATION ONLY (OUTPATIENT)
Dept: ORTHOPEDIC SURGERY | Age: 47
End: 2017-10-20

## 2017-10-20 ENCOUNTER — OFFICE VISIT (OUTPATIENT)
Dept: ORTHOPEDIC SURGERY | Age: 47
End: 2017-10-20

## 2017-10-20 VITALS
HEIGHT: 62 IN | SYSTOLIC BLOOD PRESSURE: 110 MMHG | OXYGEN SATURATION: 96 % | WEIGHT: 250 LBS | HEART RATE: 95 BPM | BODY MASS INDEX: 46.01 KG/M2 | DIASTOLIC BLOOD PRESSURE: 94 MMHG

## 2017-10-20 DIAGNOSIS — M17.11 PRIMARY OSTEOARTHRITIS OF RIGHT KNEE: Primary | ICD-10-CM

## 2017-10-20 RX ORDER — BETAMETHASONE SODIUM PHOSPHATE AND BETAMETHASONE ACETATE 3; 3 MG/ML; MG/ML
6 INJECTION, SUSPENSION INTRA-ARTICULAR; INTRALESIONAL; INTRAMUSCULAR; SOFT TISSUE ONCE
Qty: 1 ML | Refills: 0 | Status: CANCELLED
Start: 2017-10-20 | End: 2017-10-20

## 2017-10-20 NOTE — LETTER
NOTIFICATION RETURN TO WORK / SCHOOL 
 
10/20/2017 12:38 PM 
 
Ms. Christopher Rodrigues [address] [place] [phone] To Whom It May Concern: 
 
Christopher Rodrigues is currently under the care of [address] [place] [address] [place]. Have patient continue to be out of work until further notice. If there are questions or concerns please have the patient contact our office.  
 
 
 
Sincerely, 
 
 
Roselia Ware PA-C

## 2017-10-20 NOTE — PROGRESS NOTES
[address] [facility], [address] [facility]  [phone]           Patient: Fernanda Cross                MRN: [id number]       SSN: [id number]  YOB: 1970        AGE: 56 y.o. SEX: female  Body mass index is 45.73 kg/(m^2). PCP: Luther Villarreal MD  10/20/17      This office note has been dictated. REVIEW OF SYSTEMS:  Constitutional: Negative for fever, chills, weight loss and malaise/fatigue. HENT: Negative. Eyes: Negative. Respiratory: Negative. Cardiovascular: Negative. Gastrointestinal: No bowel incontinence or constipation. Genitourinary: No bladder incontinence or saddle anesthesia. Skin: Negative. Neurological: Negative. Endo/Heme/Allergies: Negative. Psychiatric/Behavioral: Negative. Musculoskeletal: As per HPI above. Past Medical History:   Diagnosis Date    Diabetes ([facility] [address]     Graves disease     Tachycardia     due to Graves Disease, on Metoprolol         Current Outpatient Prescriptions:     gabapentin (NEURONTIN) 100 mg capsule, Take  by mouth three (3) times daily. , Disp: , Rfl:     diclofenac (VOLTAREN) 1 % gel, Apply  to affected area four (4) times daily. , Disp: , Rfl:     ibuprofen (MOTRIN) 800 mg tablet, Take 1 Tab by mouth every eight (8) hours as needed for Pain., Disp: 90 Tab, Rfl: 0    ibuprofen (MOTRIN) 800 mg tablet, Take 1 Tab by mouth three (3) times daily (with meals). Do not take with Arthrotec, Disp: 60 Tab, Rfl: 0    ibuprofen (MOTRIN) 800 mg tablet, Take  by mouth., Disp: , Rfl:     levonorgestrel (MIRENA) 20 mcg/24 hr (5 years) IUD, 1 Each by IntraUTERine route once., Disp: , Rfl:     levothyroxine (SYNTHROID) 150 mcg tablet, Take 150 mcg by mouth daily. , Disp: , Rfl:     metoprolol tartrate (LOPRESSOR) 25 mg tablet, Take 25 mg by mouth daily. , Disp: , Rfl:     metFORMIN (GLUCOPHAGE) 1,000 mg tablet, Take 1,000 mg by mouth daily. , Disp: , Rfl:     calcium-vitamin D (OYSTER SHELL) 500 mg(1,250mg) -200 unit per tablet, Take 1 Tab by mouth daily. , Disp: , Rfl:     HYDROcodone-acetaminophen (NORCO) 7.5-325 mg per tablet, Take 1 Tab by mouth every six (6) hours as needed for Pain. Max Daily Amount: 4 Tabs., Disp: 21 Tab, Rfl: 0    diclofenac-miSOPROStol (ARTHROTEC 75)  mg-mcg per tablet, Take 1 Tab by mouth two (2) times a day., Disp: 60 Tab, Rfl: 0    HYDROcodone-acetaminophen (NORCO) 7.5-325 mg per tablet, Take 1-2 Tabs by mouth every four (4) hours as needed for Pain. Max Daily Amount: 12 Tabs. Do not take until after surgery, Disp: 40 Tab, Rfl: 0    No Known Allergies    Social History     Social History    Marital status: SINGLE     Spouse name: N/A    Number of children: N/A    Years of education: N/A     Occupational History    Not on file. Social History Main Topics    Smoking status: Never Smoker    Smokeless tobacco: Never Used    Alcohol use No    Drug use: No    Sexual activity: Not on file     Other Topics Concern    Not on file     Social History Narrative       Past Surgical History:   Procedure Laterality Date    HX KNEE ARTHROSCOPY      HX THYROIDECTOMY  05/13/2016           We did see Ms. Ryan Vance for followup with regards to her right knee. The patient has known advanced arthritis of the right knee. She did have a scope by Dr. Mirna Salvador and found grade IV changes inside her knee. She has failed conservative treatments. Injections do no help her at all. Her activities of daily living are affected and her quality of life as well. She has decreased walking tolerance, pain at night, trouble getting up from a chair and going up and down stairs. She does have a feeling of instability in her knee. She takes Ibuprofen. PHYSICAL EXAMINATION:  In general, the patient is alert and oriented x 3 in no acute distress. The patient is well-developed, well-nourished, with a normal affect. The patient is afebrile.  HEENT:  Head is normocephalic and atraumatic. Pupils are equally round and reactive to light and accommodation. Extraocular eye movements are intact. Neck is supple. Trachea is midline. No JVD is present. Breathing is nonlabored. Examination of the lower extremities reveals pain-free range of motion of the hips. There is no pain to palpation of the greater trochanteric bursae. There is negative straight leg raise. There is negative calf tenderness. There is negative Elliots. There is no evidence of DVT present. Examination of the right knee reveals the skin is intact. There is no ecchymosis, no warmth, and no signs of infection or cellulitis present. She does have pain with palpation tricompartmentally and crepitus arising from the anterior compartment. Findings are consistent with advanced arthritis of the right knee. Review of the scope pictures from previous show grade IV changes to the knee. RADIOGRAPHS:  Review of radiographs does confirm end-staged arthritis to the medial and patellofemoral articulations. ASSESSMENT:  Right knee end-staged osteoarthritis. PLAN:  At this point, we can get surgery scheduled. However, she does understand that if her BMI is not close to 40 around the time for surgery we will need to postpone it. The alternatives, risks, and, including but not limited to infection, blood loss, need for blood transfusion, neurovascular damage, bone fracture, anesthetic complications, DVT, PE, postoperative stiffness and pain, prosthesis longevity, need for more surgery, MI, and stroke, have been discussed. The patient understands and wishes to proceed with surgery.                   JR Wily THORNE, GINNY, ATC

## 2017-10-24 NOTE — PROGRESS NOTES
MS. FREDY CALLED TO LET US KNOW SHE NO LONGER NEEDS THE [employer]M FILLED OUT. SHE STATED THE NOTE WE GAVE HER ON 10/20/17 WAS ALL THAT WAS NEEDED.

## 2017-11-10 ENCOUNTER — OFFICE VISIT (OUTPATIENT)
Dept: ORTHOPEDIC SURGERY | Age: 47
End: 2017-11-10

## 2017-11-10 VITALS
BODY MASS INDEX: 44.53 KG/M2 | SYSTOLIC BLOOD PRESSURE: 109 MMHG | RESPIRATION RATE: 18 BRPM | HEIGHT: 62 IN | TEMPERATURE: 98.6 F | WEIGHT: 242 LBS | HEART RATE: 97 BPM | OXYGEN SATURATION: 98 % | DIASTOLIC BLOOD PRESSURE: 66 MMHG

## 2017-11-10 DIAGNOSIS — M17.11 PRIMARY OSTEOARTHRITIS OF RIGHT KNEE: Primary | ICD-10-CM

## 2017-11-10 RX ORDER — BETAMETHASONE SODIUM PHOSPHATE AND BETAMETHASONE ACETATE 3; 3 MG/ML; MG/ML
6 INJECTION, SUSPENSION INTRA-ARTICULAR; INTRALESIONAL; INTRAMUSCULAR; SOFT TISSUE ONCE
Qty: 1 ML | Refills: 0
Start: 2017-11-10 | End: 2017-11-10

## 2017-12-04 ENCOUNTER — TELEPHONE (OUTPATIENT)
Dept: ORTHOPEDIC SURGERY | Age: 47
End: 2017-12-04

## 2017-12-04 NOTE — TELEPHONE ENCOUNTER
Patient would like to speak with someone in regards to questions she has about her SX and her weight. Patient states that she is in severe pain. Please contact patient at [phone].

## 2017-12-05 NOTE — TELEPHONE ENCOUNTER
Spoke with patient. She does not want to wait until April to have her surgery done or for her BMI to be 40 or less. Advised patient that Dr. Margoth Ojeda will not do surgery until her BMI is 40 or below because of the risks and complications that could occur. She is aware of this because both Kalpesh Fernandes and Dr. Margoth Ojeda have advised of this as well. Patient is requesting her medical records so she can get a second opinion, she needs her surgery done ASAP.

## 2017-12-14 ENCOUNTER — TELEPHONE (OUTPATIENT)
Dept: ORTHOPEDIC SURGERY | Facility: CLINIC | Age: 47
End: 2017-12-14

## 2017-12-14 NOTE — TELEPHONE ENCOUNTER
Pt. Requesting CD with images. Give to medical records when ready - to be sent to [facility].

## 2020-05-29 RX ORDER — METFORMIN HYDROCHLORIDE 750 MG/1
750 TABLET, EXTENDED RELEASE ORAL 2 TIMES DAILY
COMMUNITY

## 2020-05-29 RX ORDER — CALCIUM CARBONATE/VITAMIN D3 600 MG-125
TABLET ORAL
COMMUNITY

## 2020-05-29 RX ORDER — LEVOTHYROXINE SODIUM 100 UG/1
100 TABLET ORAL
COMMUNITY

## 2020-05-29 RX ORDER — POLYETHYLENE GLYCOL 3350 17 G/17G
17 POWDER, FOR SOLUTION ORAL
COMMUNITY
End: 2020-09-28

## 2020-06-03 ENCOUNTER — ANESTHESIA EVENT (OUTPATIENT)
Dept: ENDOSCOPY | Age: 50
End: 2020-06-03
Payer: MEDICAID

## 2020-06-04 ENCOUNTER — ANESTHESIA (OUTPATIENT)
Dept: ENDOSCOPY | Age: 50
End: 2020-06-04
Payer: MEDICAID

## 2020-06-04 ENCOUNTER — HOSPITAL ENCOUNTER (OUTPATIENT)
Age: 50
Setting detail: OUTPATIENT SURGERY
Discharge: HOME | End: 2020-06-04
Attending: INTERNAL MEDICINE | Admitting: INTERNAL MEDICINE
Payer: MEDICAID

## 2020-06-04 VITALS
HEIGHT: 62 IN | WEIGHT: 242 LBS | OXYGEN SATURATION: 100 % | HEART RATE: 59 BPM | BODY MASS INDEX: 44.53 KG/M2 | SYSTOLIC BLOOD PRESSURE: 121 MMHG | DIASTOLIC BLOOD PRESSURE: 64 MMHG | RESPIRATION RATE: 19 BRPM | TEMPERATURE: 97.7 F

## 2020-06-04 LAB
GLUCOSE BLD STRIP.AUTO-MCNC: 108 MG/DL (ref 70–110)
GLUCOSE BLD STRIP.AUTO-MCNC: 79 MG/DL (ref 70–110)

## 2020-06-04 PROCEDURE — 77030013992 HC SNR POLYP ENDOSC BSC -B: Performed by: INTERNAL MEDICINE

## 2020-06-04 PROCEDURE — 76040000019: Performed by: INTERNAL MEDICINE

## 2020-06-04 PROCEDURE — 88305 TISSUE EXAM BY PATHOLOGIST: CPT

## 2020-06-04 PROCEDURE — 74011000250 HC RX REV CODE- 250: Performed by: NURSE ANESTHETIST, CERTIFIED REGISTERED

## 2020-06-04 PROCEDURE — 82962 GLUCOSE BLOOD TEST: CPT

## 2020-06-04 PROCEDURE — 74011250636 HC RX REV CODE- 250/636: Performed by: NURSE ANESTHETIST, CERTIFIED REGISTERED

## 2020-06-04 PROCEDURE — 77030008565 HC TBNG SUC IRR ERBE -B: Performed by: INTERNAL MEDICINE

## 2020-06-04 PROCEDURE — 77030019988 HC FCPS ENDOSC DISP BSC -B: Performed by: INTERNAL MEDICINE

## 2020-06-04 PROCEDURE — 77030021593 HC FCPS BIOP ENDOSC BSC -A: Performed by: INTERNAL MEDICINE

## 2020-06-04 PROCEDURE — 76060000031 HC ANESTHESIA FIRST 0.5 HR: Performed by: INTERNAL MEDICINE

## 2020-06-04 PROCEDURE — 74011250637 HC RX REV CODE- 250/637: Performed by: INTERNAL MEDICINE

## 2020-06-04 PROCEDURE — 77030029384 HC SNR POLYP CAPTVR II BSC -B: Performed by: INTERNAL MEDICINE

## 2020-06-04 PROCEDURE — 77030018846 HC SOL IRR STRL H20 ICUM -A: Performed by: INTERNAL MEDICINE

## 2020-06-04 RX ORDER — MAGNESIUM SULFATE 100 %
4 CRYSTALS MISCELLANEOUS AS NEEDED
Status: DISCONTINUED | OUTPATIENT
Start: 2020-06-04 | End: 2020-06-04 | Stop reason: HOSPADM

## 2020-06-04 RX ORDER — INSULIN LISPRO 100 [IU]/ML
INJECTION, SOLUTION INTRAVENOUS; SUBCUTANEOUS ONCE
Status: DISCONTINUED | OUTPATIENT
Start: 2020-06-04 | End: 2020-06-04 | Stop reason: HOSPADM

## 2020-06-04 RX ORDER — SODIUM CHLORIDE, SODIUM LACTATE, POTASSIUM CHLORIDE, CALCIUM CHLORIDE 600; 310; 30; 20 MG/100ML; MG/100ML; MG/100ML; MG/100ML
50 INJECTION, SOLUTION INTRAVENOUS CONTINUOUS
Status: DISCONTINUED | OUTPATIENT
Start: 2020-06-04 | End: 2020-06-04 | Stop reason: HOSPADM

## 2020-06-04 RX ORDER — DEXTROMETHORPHAN/PSEUDOEPHED 2.5-7.5/.8
DROPS ORAL AS NEEDED
Status: DISCONTINUED | OUTPATIENT
Start: 2020-06-04 | End: 2020-06-04 | Stop reason: HOSPADM

## 2020-06-04 RX ORDER — SODIUM CHLORIDE 0.9 % (FLUSH) 0.9 %
5-40 SYRINGE (ML) INJECTION EVERY 8 HOURS
Status: DISCONTINUED | OUTPATIENT
Start: 2020-06-04 | End: 2020-06-04 | Stop reason: HOSPADM

## 2020-06-04 RX ORDER — PROPOFOL 10 MG/ML
INJECTION, EMULSION INTRAVENOUS AS NEEDED
Status: DISCONTINUED | OUTPATIENT
Start: 2020-06-04 | End: 2020-06-04 | Stop reason: HOSPADM

## 2020-06-04 RX ORDER — SODIUM CHLORIDE 0.9 % (FLUSH) 0.9 %
5-40 SYRINGE (ML) INJECTION AS NEEDED
Status: DISCONTINUED | OUTPATIENT
Start: 2020-06-04 | End: 2020-06-04 | Stop reason: HOSPADM

## 2020-06-04 RX ORDER — DEXTROSE 50 % IN WATER (D50W) INTRAVENOUS SYRINGE
25-50 AS NEEDED
Status: DISCONTINUED | OUTPATIENT
Start: 2020-06-04 | End: 2020-06-04 | Stop reason: HOSPADM

## 2020-06-04 RX ORDER — LIDOCAINE HYDROCHLORIDE 20 MG/ML
INJECTION, SOLUTION EPIDURAL; INFILTRATION; INTRACAUDAL; PERINEURAL AS NEEDED
Status: DISCONTINUED | OUTPATIENT
Start: 2020-06-04 | End: 2020-06-04 | Stop reason: HOSPADM

## 2020-06-04 RX ADMIN — PROPOFOL 40 MG: 10 INJECTION, EMULSION INTRAVENOUS at 16:19

## 2020-06-04 RX ADMIN — LIDOCAINE HYDROCHLORIDE 100 MG: 20 INJECTION, SOLUTION EPIDURAL; INFILTRATION; INTRACAUDAL; PERINEURAL at 16:08

## 2020-06-04 RX ADMIN — PROPOFOL 40 MG: 10 INJECTION, EMULSION INTRAVENOUS at 16:13

## 2020-06-04 RX ADMIN — PROPOFOL 40 MG: 10 INJECTION, EMULSION INTRAVENOUS at 16:22

## 2020-06-04 RX ADMIN — PROPOFOL 40 MG: 10 INJECTION, EMULSION INTRAVENOUS at 16:16

## 2020-06-04 RX ADMIN — PROPOFOL 40 MG: 10 INJECTION, EMULSION INTRAVENOUS at 16:25

## 2020-06-04 RX ADMIN — SODIUM CHLORIDE, SODIUM LACTATE, POTASSIUM CHLORIDE, AND CALCIUM CHLORIDE 50 ML/HR: 600; 310; 30; 20 INJECTION, SOLUTION INTRAVENOUS at 13:03

## 2020-06-04 RX ADMIN — PROPOFOL 40 MG: 10 INJECTION, EMULSION INTRAVENOUS at 16:10

## 2020-06-04 RX ADMIN — PROPOFOL 80 MG: 10 INJECTION, EMULSION INTRAVENOUS at 16:08

## 2020-06-04 RX ADMIN — FAMOTIDINE 20 MG: 10 INJECTION, SOLUTION INTRAVENOUS at 13:03

## 2020-06-04 NOTE — ANESTHESIA PREPROCEDURE EVALUATION
Relevant Problems   No relevant active problems       Anesthetic History   No history of anesthetic complications            Review of Systems / Medical History  Patient summary reviewed, nursing notes reviewed and pertinent labs reviewed    Pulmonary        Sleep apnea: CPAP    Asthma : well controlled    Comments: Patient reports mild \"allergy-induced\" asthma/RAD   Neuro/Psych   Within defined limits           Cardiovascular            Dysrhythmias : sinus tachycardia  Hyperlipidemia      Comments: Reports hx/o \"tachycardia\" associated with Graves's Disease   GI/Hepatic/Renal     GERD          Comments: + Globus Sensation, Dysphagia, and Early Satiety Endo/Other    Diabetes: well controlled, type 2  Hypothyroidism: well controlled  Morbid obesity, arthritis and anemia     Other Findings   Comments: Active Problems  Reconcile with Patient's Chart  Problem Noted Date  Osteoarthritis of right knee 03/06/2018  Benign hypertension 03/06/2018  Hypothyroidism 03/06/2018  RIRI on CPAP 03/06/2018  Morbid obesity 03/06/2018  Neck pain 06/09/2016  S/P thyroidectomy 05/19/2016  Graves disease 10/21/2015  Type 2 diabetes mellitus without complication, without long-term current use of insulin 10/14/2015  Chest pain   Medical History    Medical History Date Comments  Chlamydia   [id number], treated  Diabetes mellitus ([facility] [address]   glucophage  Metabolic disease      Abnormal Pap   PCOS  Fibroid      Piercing   ears  High cholesterol      Arthritis      Coagulation disorder (HCC)   nose bleeds  Hip problem   joint  Thyroid disease   Graves  PONV (postoperative nausea and vomiting)   NAUSEA  Swallowing difficulty   R/T TO THYROID  Decreased exercise tolerance   SOME SOB WITH 2 FLIGHTS OF STEPS, NO CP.   Deficiency anemia      Snores      Morbid obesity with BMI of 40.0-44.9, adult (HCC)      Sleep apnea   PER PRE OP FORM  Esophageal reflux   PER PRE OP FORM           Physical Exam    Airway  Mallampati: III  TM Distance: 4 - 6 cm  Neck ROM: normal range of motion   Mouth opening: Normal     Cardiovascular    Rhythm: regular  Rate: normal         Dental  No notable dental hx       Pulmonary  Breath sounds clear to auscultation               Abdominal  GI exam deferred       Other Findings            Anesthetic Plan    ASA: 3  Anesthesia type: MAC and total IV anesthesia          Induction: Intravenous  Anesthetic plan and risks discussed with: Patient

## 2020-06-04 NOTE — H&P
[email or web address]  [phone]      History and Physical    Patient: Gopi Montes MRN: [id number]  SSN: [id number]    YOB: 1970  Age: 53 y.o. Sex: female      Subjective:      Gopi Montes is a 53 y.o. female who presents with globus, dysphagia, early satiety. Also due for routine CRCS. Past Medical History:   Diagnosis Date    Arthritis     Diabetes ([address]     Graves disease     Sleep apnea     cpap    Tachycardia     due to Graves Disease, on Metoprolol     Past Surgical History:   Procedure Laterality Date    HX KNEE ARTHROSCOPY      HX KNEE REPLACEMENT Right 2018    HX MENISCUS REPAIR Right 2017    HX THYROIDECTOMY  05/13/2016      Family History   Problem Relation Age of Onset    No Known Problems Mother     No Known Problems Father      Social History     Tobacco Use    Smoking status: Never Smoker    Smokeless tobacco: Never Used   Substance Use Topics    Alcohol use: No      Prior to Admission medications    Medication Sig Start Date End Date Taking? Authorizing Provider   levothyroxine (SYNTHROID) 100 mcg tablet Take 100 mcg by mouth Daily (before breakfast). Yes Provider, Historical   metFORMIN ER (GLUCOPHAGE XR) 750 mg tablet Take 750 mg by mouth two (2) times a day. Yes Provider, Historical   calcium-cholecalciferol, d3, (CALCIUM 600 + D) 600-125 mg-unit tab Take  by mouth. Yes Provider, Historical   polyethylene glycol (Purelax) 17 gram/dose powder Take 17 g by mouth daily as needed for Constipation. Yes Provider, Historical   ibuprofen (MOTRIN) 800 mg tablet Take 800 mg by mouth every eight (8) hours as needed. Yes Provider, Historical   levonorgestrel (MIRENA) 20 mcg/24 hr (5 years) IUD 1 Each by IntraUTERine route once. Yes Provider, Historical   metoprolol tartrate (LOPRESSOR) 25 mg tablet Take 25 mg by mouth daily.    Yes Other, MD Tadeo        No Known Allergies    Review of Systems:  A comprehensive review of systems was negative except for that written in the History of Present Illness. Objective:     Vitals:    05/29/20 1021   Weight: 113.4 kg (250 lb)   Height: 5' 2\" (1.575 m)        Physical Exam:  GENERAL: alert, cooperative, no distress, appears stated age  LUNG: clear to auscultation bilaterally  HEART: regular rate and rhythm, S1, S2 normal, no murmur, click, rub or gallop  ABDOMEN: soft, non-tender. Bowel sounds normal. No masses,  no organomegaly  NEUROLOGIC: alert & oriented x 3    Assessment:     1. Globus  2. Dysphagia  3. Early satiety  4. CRCS    Plan:     1. EGD/colonoscopy    Signed By: Jennie Villa MD     June 4, 2020      Jennie Villa MD  [facility], [address] [place] [address]  cell - [phone]  [email or web address]

## 2020-06-04 NOTE — ANESTHESIA POSTPROCEDURE EVALUATION
Procedure(s):  UPPER ENDOSCOPY  COLONOSCOPY.    MAC, total IV anesthesia    Anesthesia Post Evaluation      Multimodal analgesia: multimodal analgesia not used between 6 hours prior to anesthesia start to PACU discharge  Patient location during evaluation: PACU  Patient participation: complete - patient participated  Level of consciousness: sleepy but conscious  Pain score: 2  Pain management: adequate  Airway patency: patent  Anesthetic complications: no  Cardiovascular status: acceptable  Respiratory status: acceptable and room air  Hydration status: acceptable  Post anesthesia nausea and vomiting:  none  Final Post Anesthesia Temperature Assessment:  Normothermia (36.0-37.5 degrees C)      INITIAL Post-op Vital signs:   Vitals Value Taken Time   /68 6/4/2020  4:54 PM   Temp 36.5 °C (97.7 °F) 6/4/2020  4:35 PM   Pulse 56 6/4/2020  4:54 PM   Resp 18 6/4/2020  4:54 PM   SpO2 100 % 6/4/2020  4:54 PM

## 2020-06-04 NOTE — PROCEDURES
[email or web address]  [phone]        Brief Procedure Note    Johana Perkins  1970  [id number]    Date of Procedure: 6/4/2020    Preoperative diagnosis: Colon cancer Screening:   V76.51 - Z12.11  Globus sensation:   300.11 - F45.8  Dysphagia, unspecified type [R13.10]  Early satiety:   780.94 - R68.81    Postoperative diagnosis: small hiatal hernia; internal hemorrhoids, ascending polyp    Description of Findings: same    Sedation/Anesthesia: Monitored Anesthesia Care; See Anesthesia Note    Procedure: Procedure(s):  UPPER ENDOSCOPY  COLONOSCOPY    :  Dr. Pedro Burgess MD    Assistant(s): Endoscopy Technician-1: Javon Avery  Endoscopy RN-1: Lien Thomas    EBL:None    Specimens:   ID Type Source Tests Collected by Time Destination   1 : ascending polyp Preservative Colon, Ascending  Michael Reynoso MD 6/4/2020 1620 Pathology       Findings: See printed and scanned procedure note    Complications: None    Tissue Implant Device: None    Dr. Pedro Burgess MD  6/4/2020  4:33 PM    Pedro Burgess MD  [facility] [address] [phone]  [email or web address]

## 2020-09-28 ENCOUNTER — HOSPITAL ENCOUNTER (EMERGENCY)
Age: 50
Discharge: HOME | End: 2020-09-28
Attending: EMERGENCY MEDICINE
Payer: MEDICAID

## 2020-09-28 VITALS
OXYGEN SATURATION: 99 % | RESPIRATION RATE: 16 BRPM | DIASTOLIC BLOOD PRESSURE: 88 MMHG | HEART RATE: 75 BPM | TEMPERATURE: 98.2 F | SYSTOLIC BLOOD PRESSURE: 143 MMHG | WEIGHT: 240 LBS | HEIGHT: 62 IN | BODY MASS INDEX: 44.16 KG/M2

## 2020-09-28 DIAGNOSIS — B34.9 VIRAL ILLNESS: Primary | ICD-10-CM

## 2020-09-28 DIAGNOSIS — R06.00 DYSPNEA, UNSPECIFIED TYPE: ICD-10-CM

## 2020-09-28 LAB
FLUAV AG NPH QL IA: NEGATIVE
FLUBV AG NOSE QL IA: NEGATIVE
SARS-COV-2, COV2: NORMAL

## 2020-09-28 PROCEDURE — 87635 SARS-COV-2 COVID-19 AMP PRB: CPT

## 2020-09-28 PROCEDURE — 87804 INFLUENZA ASSAY W/OPTIC: CPT

## 2020-09-28 PROCEDURE — 99283 EMERGENCY DEPT VISIT LOW MDM: CPT

## 2020-09-28 NOTE — ED PROVIDER NOTES
HPI   Patient presents with a chief complaint of shortness of breath, myalgias, intermittent runny nose, and mild sore throat. She states that her symptoms began while she was at [employer] yesterday. She denies any fever, cough, chest pain, palpitations, abdominal pain, vomiting, or diarrhea. She states that she took over-the-counter TheraFlu for her symptoms. She states that she felt that her shortness of breath was secondary to her GERD and hernia.       Past Medical History:   Diagnosis Date    Arrhythmia     Arthritis     Asthma     Diabetes ([facility] [address]     GERD (gastroesophageal reflux disease)     Graves disease     Morbid obesity ([facility] [address]     Sleep apnea     cpap    Tachycardia     due to Graves Disease, on Metoprolol       Past Surgical History:   Procedure Laterality Date    COLONOSCOPY N/A 6/4/2020    COLONOSCOPY performed by Verena Zamudio MD at [address] HX KNEE ARTHROSCOPY      HX KNEE REPLACEMENT Right 2018    HX MENISCUS REPAIR Right 2017    HX THYROIDECTOMY  05/13/2016         Family History:   Problem Relation Age of Onset    No Known Problems Mother     No Known Problems Father        Social History     Socioeconomic History    Marital status: SINGLE     Spouse name: Not on file    Number of children: Not on file    Years of education: Not on file    Highest education level: Not on file   Occupational History    Not on file   Social Needs    Financial resource strain: Not on file    Food insecurity     Worry: Not on file     Inability: Not on file    Transportation needs     Medical: Not on file     Non-medical: Not on file   Tobacco Use    Smoking status: Never Smoker    Smokeless tobacco: Never Used   Substance and Sexual Activity    Alcohol use: No    Drug use: No    Sexual activity: Not on file   Lifestyle    Physical activity     Days per week: Not on file     Minutes per session: Not on file    Stress: Not on file   Relationships    Social connections Talks on phone: Not on file     Gets together: Not on file     Attends Religion service: Not on file     Active member of club or organization: Not on file     Attends meetings of clubs or organizations: Not on file     Relationship status: Not on file    Intimate partner violence     Fear of current or ex partner: Not on file     Emotionally abused: Not on file     Physically abused: Not on file     Forced sexual activity: Not on file   Other Topics Concern    Not on file   Social History Narrative    Not on file         ALLERGIES: Patient has no known allergies. Review of Systems   Constitutional: Negative for chills, diaphoresis, fatigue and fever. HENT: Positive for rhinorrhea and sore throat. Negative for congestion, dental problem, ear discharge, ear pain, hearing loss, nosebleeds, postnasal drip, sinus pressure, trouble swallowing and voice change. Eyes: Negative for photophobia, pain, discharge, redness and visual disturbance. Respiratory: Positive for shortness of breath. Negative for cough, chest tightness, wheezing and stridor. Cardiovascular: Negative for chest pain, palpitations and leg swelling. Gastrointestinal: Negative for abdominal pain, constipation, diarrhea, nausea and vomiting. Genitourinary: Negative for dysuria, flank pain, frequency, hematuria and urgency. Musculoskeletal: Positive for myalgias. Negative for arthralgias, back pain, joint swelling, neck pain and neck stiffness. Skin: Negative for color change, rash and wound. Neurological: Negative for dizziness, tremors, seizures, syncope, weakness, light-headedness, numbness and headaches. Hematological: Negative for adenopathy. Does not bruise/bleed easily. Psychiatric/Behavioral: Negative for agitation, confusion, decreased concentration, hallucinations, sleep disturbance and suicidal ideas. The patient is not nervous/anxious. All other systems reviewed and are negative.       Vitals:    09/28/20 2801 BP: (!) 143/88   Pulse: 76   Resp: 16   Temp: 98.2 °F (36.8 °C)   SpO2: 100%   Weight: 108.9 kg (240 lb)   Height: 5' 2\" (1.575 m)            Physical Exam  Vitals signs and nursing note reviewed. Constitutional:       General: She is not in acute distress. Appearance: Normal appearance. She is well-developed. She is obese. She is not diaphoretic. HENT:      Head: Normocephalic and atraumatic. Right Ear: Ear canal and external ear normal.      Left Ear: Tympanic membrane, ear canal and external ear normal.      Nose: Nose normal.      Mouth/Throat:      Mouth: Mucous membranes are moist.      Pharynx: No oropharyngeal exudate. Eyes:      General: No scleral icterus. Right eye: No discharge. Left eye: No discharge. Extraocular Movements: Extraocular movements intact. Conjunctiva/sclera: Conjunctivae normal.      Pupils: Pupils are equal, round, and reactive to light. Neck:      Musculoskeletal: Normal range of motion and neck supple. Thyroid: No thyromegaly. Vascular: No JVD. Trachea: No tracheal deviation. Cardiovascular:      Rate and Rhythm: Normal rate and regular rhythm. Heart sounds: Normal heart sounds. No murmur. No friction rub. No gallop. Pulmonary:      Effort: Pulmonary effort is normal. No respiratory distress. Breath sounds: Normal breath sounds. No stridor. No wheezing or rales. Chest:      Chest wall: No tenderness. Abdominal:      General: Bowel sounds are normal. There is no distension. Palpations: Abdomen is soft. There is no mass. Tenderness: There is no abdominal tenderness. There is no guarding or rebound. Genitourinary:     Vagina: Vaginal discharge present. Musculoskeletal: Normal range of motion. General: No tenderness. Lymphadenopathy:      Cervical: No cervical adenopathy. Skin:     General: Skin is warm and dry. Coloration: Skin is not pale. Findings: No erythema or rash. Neurological:      Mental Status: She is alert and oriented to person, place, and time. Cranial Nerves: No cranial nerve deficit. Deep Tendon Reflexes: Reflexes are normal and symmetric. Psychiatric:         Behavior: Behavior normal.         Judgment: Judgment normal.          MDM  Number of Diagnoses or Management Options  Diagnosis management comments: Differential includes: Viral illness, URI, Pneumonia unlikely. Amount and/or Complexity of Data Reviewed  Clinical lab tests: ordered and reviewed  Tests in the medicine section of CPT®: ordered and reviewed  Review and summarize past medical records: yes    Risk of Complications, Morbidity, and/or Mortality  Presenting problems: moderate  Diagnostic procedures: low  Management options: low    Patient Progress  Patient progress: stable         Procedures        Recent Results (from the past 12 hour(s))   INFLUENZA A & B AG (RAPID TEST)    Collection Time: 09/28/20  8:00 AM   Result Value Ref Range    Influenza A Antigen Negative Negative      Influenza B Antigen Negative Negative     SARS-COV-2    Collection Time: 09/28/20  8:00 AM   Result Value Ref Range    SARS-CoV-2 Please find results under separate order       8:52 AM  I reviewed the patient's labs and discharge instructions with her. Patient verbalizes her understanding. She stable for discharge home and will be instructed to follow-up with her primary care physician. Patient states that she is scheduled to have an influenza vaccine later this week at her primary care provider's office. Diagnosis:   1. Viral illness    2.  Dyspnea, unspecified type          Disposition: Discharge home    Follow-up Information     Follow up With Specialties Details Why Contact Info    Taya Moreland MD Family Medicine Schedule an appointment as soon as possible for a visit in 1 day  [address] [place] [address].  [address]       [facility] EMERGENCY DEPT Emergency Medicine  As needed, If symptoms worsen [address]  [phone]          Patient's Medications   Start Taking    No medications on file   Continue Taking    CALCIUM-CHOLECALCIFEROL, D3, (CALCIUM 600 + D) 600-125 MG-UNIT TAB    Take  by mouth. IBUPROFEN (MOTRIN) 800 MG TABLET    Take 800 mg by mouth every eight (8) hours as needed. LEVONORGESTREL (MIRENA) 20 MCG/24 HR (5 YEARS) IUD    1 Each by IntraUTERine route once. LEVOTHYROXINE (SYNTHROID) 100 MCG TABLET    Take 100 mcg by mouth Daily (before breakfast). METFORMIN ER (GLUCOPHAGE XR) 750 MG TABLET    Take 750 mg by mouth two (2) times a day. METOPROLOL TARTRATE (LOPRESSOR) 25 MG TABLET    Take 25 mg by mouth daily. These Medications have changed    No medications on file   Stop Taking    POLYETHYLENE GLYCOL (PURELAX) 17 GRAM/DOSE POWDER    Take 17 g by mouth daily as needed for Constipation.

## 2020-09-28 NOTE — ED TRIAGE NOTES
She went to  [employer] yesterday and she started with body aches and shortness of breath that she associated with GERD. Pt denies fevers or cough. Pt denies known exposure to  COVID.    Pt states she was supposed to be getting ready for work now

## 2020-09-28 NOTE — DISCHARGE INSTRUCTIONS
Patient Education   Learning About Coronavirus [phone])  Coronavirus [phone]): Overview  What is coronavirus (LZLRT-05)? The coronavirus disease (COVID-19) is caused by a virus. It is an illness that was first found in [place], [place], in December 2019. It has since spread worldwide. The virus can cause fever, cough, and trouble breathing. In severe cases, it can cause pneumonia and make it hard to breathe without help. It can cause death. Coronaviruses are a large group of viruses. They cause the common cold. They also cause more serious illnesses like Middle East respiratory syndrome (MERS) and severe acute respiratory syndrome (SARS). COVID-19 is caused by a novel coronavirus. That means it's a new type that has not been seen in people before. This virus spreads person-to-person through droplets from coughing and sneezing. It can also spread when you are close to someone who is infected. And it can spread when you touch something that has the virus on it, such as a doorknob or a tabletop. What can you do to protect yourself from coronavirus (COVID-19)? The best way to protect yourself from getting sick is to:  · Avoid areas where there is an outbreak. · Avoid contact with people who may be infected. · Wash your hands often with soap or alcohol-based hand sanitizers. · Avoid crowds and try to stay at least 6 feet away from other people. · Wash your hands often, especially after you cough or sneeze. Use soap and water, and scrub for at least 20 seconds. If soap and water aren't available, use an alcohol-based hand . · Avoid touching your mouth, nose, and eyes. What can you do to avoid spreading the virus to others? To help avoid spreading the virus to others:  · Cover your mouth with a tissue when you cough or sneeze. Then throw the tissue in the trash. · Use a disinfectant to clean things that you touch often. · Stay home if you are sick or have been exposed to the virus.  Don't go to school, work, or public areas. And don't use public transportation. · If you are sick:  ? Leave your home only if you need to get medical care. But call the doctor's office first so they know you're coming. And wear a face mask, if you have one.  ? If you have a face mask, wear it whenever you're around other people. It can help stop the spread of the virus when you cough or sneeze. ? Clean and disinfect your home every day. Use household  and disinfectant wipes or sprays. Take special care to clean things that you grab with your hands. These include doorknobs, remote controls, phones, and handles on your refrigerator and microwave. And don't forget countertops, tabletops, bathrooms, and computer keyboards. When to call for help  Call 911 anytime you think you may need emergency care. For example, call if:  · You have severe trouble breathing. (You can't talk at all.)  · You have constant chest pain or pressure. · You are severely dizzy or lightheaded. · You are confused or can't think clearly. · Your face and lips have a blue color. · You pass out (lose consciousness) or are very hard to wake up. Call your doctor now if you develop symptoms such as:  · Shortness of breath. · Fever. · Cough. If you need to get care, call ahead to the doctor's office for instructions before you go. Make sure you wear a face mask, if you have one, to prevent exposing other people to the virus. Where can you get the latest information? The following health organizations are tracking and studying this virus. Their websites contain the most up-to-date information. Luke Talamantes also learn what to do if you think you may have been exposed to the virus. · U.S. Centers for Disease Control and Prevention (CDC): The CDC provides updated news about the disease and travel advice. The website also tells you how to prevent the spread of infection.  www.cdc.gov  · World Health Organization [facility]): WHO offers information about the virus outbreaks. WHO also has travel advice. www.who.int  Current as of: April 1, 2020               Content Version: 12.4  © 2006-2020 [employer]. Care instructions adapted under license by your healthcare professional. If you have questions about a medical condition or this instruction, always ask your healthcare professional. [address] any warranty or liability for your use of this information. Patient Education        Shortness of Breath: Care Instructions  Your Care Instructions     Shortness of breath has many causes. Sometimes conditions such as anxiety can lead to shortness of breath. Some people get mild shortness of breath when they exercise. Trouble breathing also can be a symptom of a serious problem, such as asthma, lung disease, emphysema, heart problems, and pneumonia. If your shortness of breath continues, you may need tests and treatment. Watch for any changes in your breathing and other symptoms. Follow-up care is a key part of your treatment and safety. Be sure to make and go to all appointments, and call your doctor if you are having problems. It's also a good idea to know your test results and keep a list of the medicines you take. How can you care for yourself at home? · Do not smoke or allow others to smoke around you. If you need help quitting, talk to your doctor about stop-smoking programs and medicines. These can increase your chances of quitting for good. · Get plenty of rest and sleep. · Take your medicines exactly as prescribed. Call your doctor if you think you are having a problem with your medicine. · Find healthy ways to deal with stress. ? Exercise daily. ? Get plenty of sleep. ? Eat regularly and well. When should you call for help? Call 911 anytime you think you may need emergency care. For example, call if:    · You have severe shortness of breath.     · You have symptoms of a heart attack. These may include:  ?  Chest pain or pressure, or a strange feeling in the chest.  ? Sweating. ? Shortness of breath. ? Nausea or vomiting. ? Pain, pressure, or a strange feeling in the back, neck, jaw, or upper belly or in one or both shoulders or arms. ? Lightheadedness or sudden weakness. ? A fast or irregular heartbeat. After you call 911, the  may tell you to chew 1 adult-strength or 2 to 4 low-dose aspirin. Wait for an ambulance. Do not try to drive yourself. Call your doctor now or seek immediate medical care if:    · Your shortness of breath gets worse or you start to wheeze. Wheezing is a high-pitched sound when you breathe.     · You wake up at night out of breath or have to prop your head up on several pillows to breathe.     · You are short of breath after only light activity or while at rest.   Watch closely for changes in your health, and be sure to contact your doctor if:    · You do not get better over the next 1 to 2 days. Where can you learn more? Go to http://www.gray.com/  Enter S780 in the search box to learn more about \"Shortness of Breath: Care Instructions. \"  Current as of: February 24, 2020               Content Version: 12.6  © 3864-3341 [employer]. Care instructions adapted under license by [employer] (which disclaims liability or warranty for this information). If you have questions about a medical condition or this instruction, always ask your healthcare professional. [place] [address] any warranty or liability for your use of this information. Patient Education        Viral Infections: Care Instructions  Your Care Instructions     You don't feel well, but it's not clear what's causing it. You may have a viral infection. Viruses cause many illnesses, such as the common cold, influenza, fever, rashes, and the diarrhea, nausea, and vomiting that are often called \"stomach flu. \" You may wonder if antibiotic medicines could make you feel better. But antibiotics only treat infections caused by bacteria. They don't work on viruses. The good news is that viral infections usually aren't serious. Most will go away in a few days without medical treatment. In the meantime, there are a few things you can do to make yourself more comfortable. Follow-up care is a key part of your treatment and safety. Be sure to make and go to all appointments, and call your doctor if you are having problems. It's also a good idea to know your test results and keep a list of the medicines you take. How can you care for yourself at home? · Get plenty of rest if you feel tired. · Take an over-the-counter pain medicine if needed, such as acetaminophen (Tylenol), ibuprofen (Advil, Motrin), or naproxen (Aleve). Read and follow all instructions on the label. · Be careful when taking over-the-counter cold or flu medicines and Tylenol at the same time. Many of these medicines have acetaminophen, which is Tylenol. Read the labels to make sure that you are not taking more than the recommended dose. Too much acetaminophen (Tylenol) can be harmful. · Drink plenty of fluids, enough so that your urine is light yellow or clear like water. If you have kidney, heart, or liver disease and have to limit fluids, talk with your doctor before you increase the amount of fluids you drink. · Stay home from work, school, and other public places while you have a fever. When should you call for help? Call 911 anytime you think you may need emergency care. For example, call if:    · You have severe trouble breathing.     · You passed out (lost consciousness). Call your doctor now or seek immediate medical care if:    · You seem to be getting much sicker.     · You have a new or higher fever.     · You have blood in your stools.     · You have new belly pain, or your pain gets worse.     · You have a new rash.    Watch closely for changes in your health, and be sure to contact your doctor if:    · You start to get better and then get worse.     · You do not get better as expected. Where can you learn more? Go to http://www.gray.com/  Enter L906 in the search box to learn more about \"Viral Infections: Care Instructions. \"  Current as of: February 11, 2020               Content Version: 12.6  © 6740-6226 [employer]. Care instructions adapted under license by [employer] (which disclaims liability or warranty for this information). If you have questions about a medical condition or this instruction, always ask your healthcare professional. [place] [address] any warranty or liability for your use of this information.

## 2020-10-01 LAB — SARS-COV-2, COV2NT: NOT DETECTED

## 2022-07-06 NOTE — PROGRESS NOTES
Patient dropped off an Attending [de-identified] Statement from [facility] at the [facility] location. Patient signed Carleen Nick to fax to [phone] when complete. Detail Level: Detailed Quality 130: Documentation Of Current Medications In The Medical Record: Current Medications Documented

## 2022-07-13 ENCOUNTER — HOSPITAL ENCOUNTER (EMERGENCY)
Age: 52
Discharge: HOME | End: 2022-07-13
Attending: FAMILY MEDICINE
Payer: MEDICAID

## 2022-07-13 VITALS
SYSTOLIC BLOOD PRESSURE: 121 MMHG | WEIGHT: 244 LBS | HEART RATE: 84 BPM | TEMPERATURE: 97.9 F | RESPIRATION RATE: 16 BRPM | DIASTOLIC BLOOD PRESSURE: 69 MMHG | BODY MASS INDEX: 44.9 KG/M2 | HEIGHT: 62 IN | OXYGEN SATURATION: 98 %

## 2022-07-13 DIAGNOSIS — K20.80 PILL ESOPHAGITIS: Primary | ICD-10-CM

## 2022-07-13 DIAGNOSIS — T50.905A PILL ESOPHAGITIS: Primary | ICD-10-CM

## 2022-07-13 PROCEDURE — 99283 EMERGENCY DEPT VISIT LOW MDM: CPT

## 2022-07-13 RX ORDER — ALBUTEROL SULFATE 0.83 MG/ML
SOLUTION RESPIRATORY (INHALATION)
COMMUNITY
Start: 2022-07-13

## 2022-07-13 RX ORDER — ATORVASTATIN CALCIUM 10 MG/1
TABLET, FILM COATED ORAL
COMMUNITY
Start: 2022-04-11

## 2022-07-14 NOTE — DISCHARGE INSTRUCTIONS
Thank you for allowing us to provide you with excellent care today. We hope we addressed all of your concerns and needs. We strive to provide excellent quality care in the Emergency Department. Anything less than excellent does not meet our expectations for you. If you feel that you have not received excellent quality care or timely care, please ask to speak to the nurse manager. Please choose us in the future for your continued health care needs. The exam and treatment you received in the Emergency Department were for an urgent problem and are not intended as complete care. It is important that you follow-up with a doctor, nurse practitioner, or physician assistant to:  (1) confirm your diagnosis,  (2) re-evaluation of changes in your illness and treatment, and  (3) for ongoing care. If your symptoms become worse or you do not improve as expected and you are unable to reach your usual health care provider, you should return to the Emergency Department. We are available 24 hours a day. Take this sheet with you when you go to your follow-up visit. If you have any problem arranging the follow-up visit, contact [phone] ([phone])    Make an appointment with your Primary Care doctor for follow up of this visit. Return to the ER if you are unable to be seen in the time recommended on your discharge instructions.

## 2022-07-14 NOTE — ED TRIAGE NOTES
Patient states she took her evening Metformin 2 tabs and they got stuck in her throat. Patient states she was driving to the hospital and then felt like she was choking and went to police station and called 91. Patient states on the way here in the ambulance she felt like they finally went down.

## 2022-07-14 NOTE — ED PROVIDER NOTES
EMERGENCY DEPARTMENT HISTORY AND PHYSICAL EXAM      Date: 7/13/2022  Patient Name: Keren Higgins    History of Presenting Illness     Chief Complaint   Patient presents with    Swallowed Foreign Body       History Provided By: Patient and EMS    HPI: Keren Higgins, 47 y.o. female with a past medical history significant diabetes, hypertension, osteoarthritis and Grave's Disease, GERD, hyperlipidemia presents to the ED with cc of transient choking on her medication. Patient states she took her evening Metformin 2 tabs and they got stuck in her throat. Patient states she was driving to the hospital and then felt like she was choking and went to police station and called 911. Patient states on the way here in the ambulance she felt like they finally went down. There are no other complaints, changes, or physical findings at this time. PCP: Aniyah Uriostgeui MD    No current facility-administered medications on file prior to encounter. Current Outpatient Medications on File Prior to Encounter   Medication Sig Dispense Refill    albuterol (PROVENTIL VENTOLIN) 2.5 mg /3 mL (0.083 %) nebu       atorvastatin (LIPITOR) 10 mg tablet TAKE 1 TABLET BY MOUTH EVERY DAY FOR 90 DAYS      levothyroxine (SYNTHROID) 100 mcg tablet Take 100 mcg by mouth Daily (before breakfast).  metFORMIN ER (GLUCOPHAGE XR) 750 mg tablet Take 750 mg by mouth two (2) times a day.  calcium-cholecalciferol, d3, (CALCIUM 600 + D) 600-125 mg-unit tab Take  by mouth.  ibuprofen (MOTRIN) 800 mg tablet Take 800 mg by mouth every eight (8) hours as needed.  levonorgestrel (MIRENA) 20 mcg/24 hr (5 years) IUD 1 Each by IntraUTERine route once.  metoprolol tartrate (LOPRESSOR) 25 mg tablet Take 25 mg by mouth daily.          Past History     Past Medical History:  Past Medical History:   Diagnosis Date    Arrhythmia     Arthritis     Asthma     Diabetes ([facility] [address]     GERD (gastroesophageal reflux disease)     Graves disease  Morbid obesity ([facility] [address]     Sleep apnea     cpap    Tachycardia     due to Graves Disease, on Metoprolol       Past Surgical History:  Past Surgical History:   Procedure Laterality Date    COLONOSCOPY N/A 6/4/2020    COLONOSCOPY performed by Siria Guerrero MD at [address] HX KNEE ARTHROSCOPY      HX KNEE REPLACEMENT Right 2018    HX MENISCUS REPAIR Right 2017    HX THYROIDECTOMY  05/13/2016       Family History:  Family History   Problem Relation Age of Onset    No Known Problems Mother     No Known Problems Father        Social History:  Social History     Tobacco Use    Smoking status: Never Smoker    Smokeless tobacco: Never Used   Substance Use Topics    Alcohol use: No    Drug use: No       Allergies:  No Known Allergies      Review of Systems   CONSTITUTIONAL: Denies weight loss, fever and chills. HEENT: Denies changes in vision and hearing. RESPIRATORY: Denies SOB and cough. CV: Denies palpitations and CP. GI: Denies abdominal pain, nausea, vomiting and diarrhea. : Denies dysuria and urinary frequency. MSK: Denies myalgia and joint pain. SKIN: Denies rash and pruritus. NEUROLOGICAL: Denies headache and syncope. PSYCHIATRIC: Denies recent changes in mood. Denies anxiety and depression. Review of Systems    Physical Exam   GENERAL: Afebrile. O2 sat 99% on room air. Comfortable. Alert and oriented x 3. No acute distress. Well-nourished. EYES: EOMI. Anicteric. HENT: Moist mucous membranes. No scleral icterus. No cervical lymphadenopathy. LUNGS: Clear to auscultation bilaterally. No accessory muscle use. CARDIOVASCULAR: Regular rate and rhythm. No murmur. No JVD. ABDOMEN: Soft, non-tender and non-distended. No palpable masses. EXTREMITIES: No edema. Non-tender. SKIN: No rashes or lesions. Warm. NEUROLOGIC: No focal neurological deficits. CN II-XII grossly intact. PSYCHIATRIC: Cooperative. Appropriate mood and affect.     Physical Exam    Lab and Diagnostic Study Results     Labs -   No results found for this or any previous visit (from the past 12 hour(s)). Radiologic Studies -   @lastxrresult@  CT Results  (Last 48 hours)    None        CXR Results  (Last 48 hours)    None            Medical Decision Making   - I am the first provider for this patient. - I reviewed the vital signs, available nursing notes, past medical history, past surgical history, family history and social history. - Initial assessment performed. The patients presenting problems have been discussed, and they are in agreement with the care plan formulated and outlined with them. I have encouraged them to ask questions as they arise throughout their visit. Vital Signs-Reviewed the patient's vital signs. Patient Vitals for the past 12 hrs:   Temp Pulse Resp BP SpO2   07/13/22 2214 97.9 °F (36.6 °C) 81 16 134/70 97 %       Records Reviewed: Nursing Notes and Ambulance Run Sheet    The patient presents with transient choking spell while swallowing 2 metformin tablets with subsequent discomfort in esophagus with a differential diagnosis of GERD, pill esophagitis, aspiration, reflux, globs sensation      ED Course:          Provider Notes (Medical Decision Making):   50-year-old female presents the ER via EMS after experiencing transient choking while trying to swallow to whole metformin tablets. She drove herself to EMS and while in the ambulance, the symptoms resolved. She arrives with normal vital signs and an oxygen saturation 99% on room air. She endorses a mild irritation in the esophagus when she swallows and denies any dysphagia, dysphonia, difficulty breathing, speaking, or a cough. No chest pain or shortness of breath. She is counseled on pill esophagitis especially with her history of GERD. Her physical is benign and she passes a p.o. challenge. She is advised to take her pills 1 at a time with a full glass of water. She verbalized understanding.   She is given a referral to  [facility] in case she needs it. At time of discharge she feels ready to go and comfortable with disposition. MDM       Procedures   Medical Decision Makingedical Decision Making  Performed by: Maksim Jimenez DO  PROCEDURES:  Procedures       Disposition   Disposition: Condition improved  DC- Adult Discharges: All of the diagnostic tests were reviewed and questions answered. Diagnosis, care plan and treatment options were discussed. The patient understands the instructions and will follow up as directed. The patients results have been reviewed with them. They have been counseled regarding their diagnosis. The patient verbally convey understanding and agreement of the signs, symptoms, diagnosis, treatment and prognosis and additionally agrees to follow up as recommended with their PCP in 24 - 48 hours. They also agree with the care-plan and convey that all of their questions have been answered. I have also put together some discharge instructions for them that include: 1) educational information regarding their diagnosis, 2) how to care for their diagnosis at home, as well a 3) list of reasons why they would want to return to the ED prior to their follow-up appointment, should their condition change. DC-The patient was given verbal follow-up and symptomatic/supportive measure instructions        DISCHARGE PLAN:  1. Current Discharge Medication List      CONTINUE these medications which have NOT CHANGED    Details   albuterol (PROVENTIL VENTOLIN) 2.5 mg /3 mL (0.083 %) nebu       atorvastatin (LIPITOR) 10 mg tablet TAKE 1 TABLET BY MOUTH EVERY DAY FOR 90 DAYS      levothyroxine (SYNTHROID) 100 mcg tablet Take 100 mcg by mouth Daily (before breakfast). metFORMIN ER (GLUCOPHAGE XR) 750 mg tablet Take 750 mg by mouth two (2) times a day. calcium-cholecalciferol, d3, (CALCIUM 600 + D) 600-125 mg-unit tab Take  by mouth.       ibuprofen (MOTRIN) 800 mg tablet Take 800 mg by mouth every eight (8) hours as needed. levonorgestrel (MIRENA) 20 mcg/24 hr (5 years) IUD 1 Each by IntraUTERine route once. metoprolol tartrate (LOPRESSOR) 25 mg tablet Take 25 mg by mouth daily. 2.   Follow-up Information    None       3. Return to ED if worse   4. Current Discharge Medication List            Diagnosis     Clinical Impression: No diagnosis found. Attestations:    Renetta Brown, DO    Please note that this dictation was completed with [employer], the computer voice recognition software. Quite often unanticipated grammatical, syntax, homophones, and other interpretive errors are inadvertently transcribed by the computer software. Please disregard these errors. Please excuse any errors that have escaped final proofreading. Thank you.

## 2022-07-14 NOTE — PROGRESS NOTES
7/14 Per Dr. Elian Lozada reviewed. She felt as if a pill got stuck and then it passed on its own. Problem is solved and she does not need seen. Declined to schedule. She also has a regular gastroenterologist if she wants to see someone. \"

## 2022-07-14 NOTE — ED NOTES
Access to care called to arrange transport home. Trip number is [phone]. States is could take up to 3 hours.

## 2025-01-21 ENCOUNTER — APPOINTMENT (OUTPATIENT)
Age: 55
End: 2025-01-21
Payer: MEDICAID

## 2025-01-21 ENCOUNTER — HOSPITAL ENCOUNTER (EMERGENCY)
Age: 55
Discharge: HOME | End: 2025-01-22
Attending: FAMILY MEDICINE
Payer: MEDICAID

## 2025-01-21 DIAGNOSIS — K59.01 SLOW TRANSIT CONSTIPATION: ICD-10-CM

## 2025-01-21 DIAGNOSIS — R07.89 OTHER CHEST PAIN: Primary | ICD-10-CM

## 2025-01-21 LAB
ALBUMIN SERPL-MCNC: 3.4 G/DL (ref 3.4–5)
ALBUMIN/GLOB SERPL: 0.8 (ref 0.8–1.7)
ALP SERPL-CCNC: 85 U/L (ref 45–117)
ALT SERPL-CCNC: 25 U/L (ref 13–56)
ANION GAP SERPL CALC-SCNC: 5 MMOL/L (ref 3–18)
AST SERPL W P-5'-P-CCNC: 20 U/L (ref 10–38)
BASOPHILS # BLD: 0.02 K/UL (ref 0–0.1)
BASOPHILS NFR BLD: 0.3 % (ref 0–2)
BILIRUB SERPL-MCNC: 0.5 MG/DL (ref 0.2–1)
BUN SERPL-MCNC: 11 MG/DL (ref 7–18)
BUN/CREAT SERPL: 20 (ref 12–20)
CA-I BLD-MCNC: 8.8 MG/DL (ref 8.5–10.1)
CHLORIDE SERPL-SCNC: 104 MMOL/L (ref 100–111)
CO2 SERPL-SCNC: 31 MMOL/L (ref 21–32)
CREAT SERPL-MCNC: 0.54 MG/DL (ref 0.6–1.3)
DIFFERENTIAL METHOD BLD: ABNORMAL
EOSINOPHIL # BLD: 0.09 K/UL (ref 0–0.4)
EOSINOPHIL NFR BLD: 1.2 % (ref 0–5)
ERYTHROCYTE [DISTWIDTH] IN BLOOD BY AUTOMATED COUNT: 14.7 % (ref 11.6–14.5)
FLUAV RNA SPEC QL NAA+PROBE: NOT DETECTED
FLUBV RNA SPEC QL NAA+PROBE: NOT DETECTED
GLOBULIN SER CALC-MCNC: 4.5 G/DL (ref 2–4)
GLUCOSE SERPL-MCNC: 104 MG/DL (ref 74–99)
HCT VFR BLD AUTO: 37.9 % (ref 35–45)
HGB BLD-MCNC: 12.1 G/DL (ref 12–16)
IMM GRANULOCYTES # BLD AUTO: 0.02 K/UL (ref 0–0.04)
IMM GRANULOCYTES NFR BLD AUTO: 0.3 % (ref 0–0.5)
LIPASE SERPL-CCNC: 38 U/L (ref 13–75)
LYMPHOCYTES # BLD: 2.35 K/UL (ref 0.9–3.6)
LYMPHOCYTES NFR BLD: 30.8 % (ref 21–52)
MAGNESIUM SERPL-MCNC: 2.1 MG/DL (ref 1.6–2.6)
MCH RBC QN AUTO: 27.7 PG (ref 24–34)
MCHC RBC AUTO-ENTMCNC: 31.9 G/DL (ref 31–37)
MCV RBC AUTO: 86.7 FL (ref 78–100)
MONOCYTES # BLD: 0.59 K/UL (ref 0.05–1.2)
MONOCYTES NFR BLD: 7.7 % (ref 3–10)
NEUTS SEG # BLD: 4.55 K/UL (ref 1.8–8)
NEUTS SEG NFR BLD: 59.7 % (ref 40–73)
NRBC # BLD: 0 K/UL (ref 0–0.01)
NRBC BLD-RTO: 0 PER 100 WBC
PLATELET # BLD AUTO: 280 K/UL (ref 135–420)
PMV BLD AUTO: 9.8 FL (ref 9.2–11.8)
POTASSIUM SERPL-SCNC: 3.7 MMOL/L (ref 3.5–5.5)
PROT SERPL-MCNC: 7.9 G/DL (ref 6.4–8.2)
RBC # BLD AUTO: 4.37 M/UL (ref 4.2–5.3)
SARS-COV-2 RNA RESP QL NAA+PROBE: NOT DETECTED
SODIUM SERPL-SCNC: 140 MMOL/L (ref 136–145)
TROPONIN I SERPL HS-MCNC: <3 NG/L (ref 0–54)
TROPONIN I SERPL HS-MCNC: <3 NG/L (ref 0–54)
TSH SERPL DL<=0.05 MIU/L-ACNC: 0.62 UIU/ML (ref 0.36–3.74)
WBC # BLD AUTO: 7.6 K/UL (ref 4.6–13.2)

## 2025-01-21 PROCEDURE — 2580000003 HC RX 258: Performed by: FAMILY MEDICINE

## 2025-01-21 PROCEDURE — 85025 COMPLETE CBC W/AUTO DIFF WBC: CPT

## 2025-01-21 PROCEDURE — 83690 ASSAY OF LIPASE: CPT

## 2025-01-21 PROCEDURE — 99285 EMERGENCY DEPT VISIT HI MDM: CPT

## 2025-01-21 PROCEDURE — 84484 ASSAY OF TROPONIN QUANT: CPT

## 2025-01-21 PROCEDURE — 74018 RADEX ABDOMEN 1 VIEW: CPT

## 2025-01-21 PROCEDURE — 96374 THER/PROPH/DIAG INJ IV PUSH: CPT

## 2025-01-21 PROCEDURE — 6360000002 HC RX W HCPCS: Performed by: FAMILY MEDICINE

## 2025-01-21 PROCEDURE — 83735 ASSAY OF MAGNESIUM: CPT

## 2025-01-21 PROCEDURE — 84443 ASSAY THYROID STIM HORMONE: CPT

## 2025-01-21 PROCEDURE — 36415 COLL VENOUS BLD VENIPUNCTURE: CPT

## 2025-01-21 PROCEDURE — 96375 TX/PRO/DX INJ NEW DRUG ADDON: CPT

## 2025-01-21 PROCEDURE — 93005 ELECTROCARDIOGRAM TRACING: CPT | Performed by: FAMILY MEDICINE

## 2025-01-21 PROCEDURE — 80053 COMPREHEN METABOLIC PANEL: CPT

## 2025-01-21 PROCEDURE — 71045 X-RAY EXAM CHEST 1 VIEW: CPT

## 2025-01-21 PROCEDURE — 2500000003 HC RX 250 WO HCPCS: Performed by: FAMILY MEDICINE

## 2025-01-21 PROCEDURE — 87636 SARSCOV2 & INF A&B AMP PRB: CPT

## 2025-01-21 RX ORDER — METOCLOPRAMIDE HYDROCHLORIDE 5 MG/ML
5 INJECTION INTRAMUSCULAR; INTRAVENOUS
Status: COMPLETED | OUTPATIENT
Start: 2025-01-21 | End: 2025-01-21

## 2025-01-21 RX ORDER — POLYETHYLENE GLYCOL 3350 17 G/17G
17 POWDER, FOR SOLUTION ORAL DAILY
Qty: 15 PACKET | Refills: 0 | Status: SHIPPED | OUTPATIENT
Start: 2025-01-21 | End: 2025-02-05

## 2025-01-21 RX ORDER — KETOROLAC TROMETHAMINE 30 MG/ML
15 INJECTION, SOLUTION INTRAMUSCULAR; INTRAVENOUS ONCE
Status: COMPLETED | OUTPATIENT
Start: 2025-01-21 | End: 2025-01-21

## 2025-01-21 RX ADMIN — KETOROLAC TROMETHAMINE 15 MG: 30 INJECTION, SOLUTION INTRAMUSCULAR at 22:42

## 2025-01-21 RX ADMIN — METOCLOPRAMIDE 5 MG: 5 INJECTION, SOLUTION INTRAMUSCULAR; INTRAVENOUS at 22:41

## 2025-01-21 RX ADMIN — FAMOTIDINE 20 MG: 10 INJECTION, SOLUTION INTRAVENOUS at 22:44

## 2025-01-21 ASSESSMENT — LIFESTYLE VARIABLES
HOW OFTEN DO YOU HAVE A DRINK CONTAINING ALCOHOL: NEVER
HOW MANY STANDARD DRINKS CONTAINING ALCOHOL DO YOU HAVE ON A TYPICAL DAY: PATIENT DOES NOT DRINK

## 2025-01-22 VITALS
HEART RATE: 64 BPM | BODY MASS INDEX: 45.18 KG/M2 | TEMPERATURE: 98.7 F | DIASTOLIC BLOOD PRESSURE: 62 MMHG | SYSTOLIC BLOOD PRESSURE: 121 MMHG | OXYGEN SATURATION: 100 % | RESPIRATION RATE: 23 BRPM | WEIGHT: 247 LBS

## 2025-01-22 LAB
APPEARANCE UR: CLEAR
BILIRUB UR QL: NEGATIVE
COLOR UR: YELLOW
EKG ATRIAL RATE: 68 BPM
EKG DIAGNOSIS: NORMAL
EKG P AXIS: 62 DEGREES
EKG P-R INTERVAL: 190 MS
EKG Q-T INTERVAL: 364 MS
EKG QRS DURATION: 78 MS
EKG QTC CALCULATION (BAZETT): 387 MS
EKG R AXIS: 3 DEGREES
EKG T AXIS: 45 DEGREES
EKG VENTRICULAR RATE: 68 BPM
GLUCOSE UR STRIP.AUTO-MCNC: NEGATIVE MG/DL
HGB UR QL STRIP: NEGATIVE
KETONES UR QL STRIP.AUTO: NEGATIVE MG/DL
LEUKOCYTE ESTERASE UR QL STRIP.AUTO: NEGATIVE
NITRITE UR QL STRIP.AUTO: NEGATIVE
PH UR STRIP: 7 (ref 5–8)
PROT UR STRIP-MCNC: NEGATIVE MG/DL
SP GR UR REFRACTOMETRY: 1.02 (ref 1–1.03)
UROBILINOGEN UR QL STRIP.AUTO: 0.2 EU/DL (ref 0.2–1)

## 2025-01-22 PROCEDURE — 81003 URINALYSIS AUTO W/O SCOPE: CPT

## 2025-01-22 NOTE — ED PROVIDER NOTES
errors.  Please excuse any errors that have escaped final proofreading.    Dr. Beth Mccormick DO  (Electronically signed)    (Please note that portions of this note were completed with a voice recognition program.  Efforts were made to edit the dictations but occasionally words are mis-transcribed.)       Beth Mccormick DO  01/22/25 0031

## 2025-01-22 NOTE — ED TRIAGE NOTES
PT states she has had L arm pain for 1 week, no known injury or trauma. Pt states she has had L sided check pain radiating into her neck since last night. Pt states she has also been burping a lot today. Rates arm and chest pain 6/10.

## 2025-01-22 NOTE — DISCHARGE INSTRUCTIONS
*It is important to ensure you have daily bowel movements, especially while taking medicines like semaglutide (Ozempic)  *May take MiraLAX 1-2 times daily.  Also may get over-the-counter bottle of magnesium citrate and take 1 bottle to help clear out stool.  Talk to your primary care provider at your follow-up appointment.  *Take your pantoprazole every day

## 2025-01-27 ENCOUNTER — HOSPITAL ENCOUNTER (OUTPATIENT)
Age: 55
Setting detail: SPECIMEN
Discharge: HOME | End: 2025-01-30

## 2025-01-27 PROCEDURE — 86480 TB TEST CELL IMMUN MEASURE: CPT

## 2025-07-01 RX ORDER — PANTOPRAZOLE SODIUM 40 MG/1
40 TABLET, DELAYED RELEASE ORAL DAILY
COMMUNITY

## 2025-07-01 RX ORDER — ONDANSETRON 4 MG/1
4 TABLET, FILM COATED ORAL EVERY 8 HOURS PRN
COMMUNITY

## 2025-07-01 RX ORDER — MELOXICAM 15 MG/1
15 TABLET ORAL DAILY
COMMUNITY

## 2025-07-01 RX ORDER — ASPIRIN 81 MG/1
81 TABLET ORAL DAILY
COMMUNITY

## 2025-07-01 NOTE — PROGRESS NOTES
Instructions for your procedure at [facility]      Today's Date: 7/1/2025      Patient's Name: Cherrie Mayen      Procedure Date: 7/9/2025        Please enter the main entrance of the hospital and check-in at the  located in the lobby.      Do NOT eat or drink anything, including candy, gum, or ice chips after midnight prior to your procedure, unless it is part of your prep.  Brush your teeth before coming to the hospital.You may swish with water, but do not swallow.  No smoking/Vaping/E-Cigarettes 24 hours prior to the day of procedure.  No alcohol 24 hours prior to the day of procedure.  No recreational drugs for one week prior to the day of procedure.  Bring Photo ID, Insurance information, and Co-pay if required on day of procedure.  Bring in pertinent legal documents, such as, Medical Power of , DNR, Advance Directive, etc.  Leave all other valuables, including money/purse, weapons at home.  Remove jewelry, including ALL body piercings, nail polish, acrylic nails, and makeup (including mascara); no lotions, powders, deodorant, and/or perfume/cologne/after shave on the skin.  Glasses and dentures may be worn to the hospital.  They must be removed prior to procedure. Please bring case/container for glasses or dentures.  11. Contacts should not be worn on day of procedure.   12. Call the office ([phone]) if you have symptoms of a cold or illness within 24-48 hours prior to your procedure.   13. AN ADULT (relative or friend 18 years or older) MUST DRIVE YOU HOME AFTER YOUR PROCEDURE.   14. Please make arrangements for a responsible adult (18 years or older) to be with you for 24 hours after your procedure.   15. TWO VISITORS will be allowed in the waiting area during your procedure.       Special Instructions:      Bring list of CURRENT medications.  Follow instructions from the office regarding Bowel Prep, Vitamins, Iron, Blood Thinners, Insulin, Seizure, and

## 2025-07-08 ENCOUNTER — ANESTHESIA EVENT (OUTPATIENT)
Facility: HOSPITAL | Age: 55
End: 2025-07-08
Payer: MEDICAID

## 2025-07-09 ENCOUNTER — ANESTHESIA (OUTPATIENT)
Facility: HOSPITAL | Age: 55
End: 2025-07-09
Payer: MEDICAID

## 2025-07-09 ENCOUNTER — HOSPITAL ENCOUNTER (OUTPATIENT)
Facility: HOSPITAL | Age: 55
Setting detail: OUTPATIENT SURGERY
Discharge: HOME | End: 2025-07-09
Attending: INTERNAL MEDICINE | Admitting: INTERNAL MEDICINE
Payer: MEDICAID

## 2025-07-09 VITALS
OXYGEN SATURATION: 97 % | SYSTOLIC BLOOD PRESSURE: 117 MMHG | HEART RATE: 79 BPM | HEIGHT: 62 IN | TEMPERATURE: 97.7 F | BODY MASS INDEX: 42.53 KG/M2 | WEIGHT: 231.1 LBS | DIASTOLIC BLOOD PRESSURE: 65 MMHG | RESPIRATION RATE: 16 BRPM

## 2025-07-09 LAB
GLUCOSE BLD STRIP.AUTO-MCNC: 118 MG/DL (ref 70–110)
GLUCOSE BLD STRIP.AUTO-MCNC: 81 MG/DL (ref 70–110)
GLUCOSE BLD STRIP.AUTO-MCNC: 96 MG/DL (ref 70–110)
GLUCOSE BLD STRIP.AUTO-MCNC: 97 MG/DL (ref 70–110)

## 2025-07-09 PROCEDURE — 88305 TISSUE EXAM BY PATHOLOGIST: CPT

## 2025-07-09 PROCEDURE — 2580000003 HC RX 258: Performed by: NURSE ANESTHETIST, CERTIFIED REGISTERED

## 2025-07-09 PROCEDURE — 82962 GLUCOSE BLOOD TEST: CPT

## 2025-07-09 PROCEDURE — 7100000010 HC PHASE II RECOVERY - FIRST 15 MIN: Performed by: INTERNAL MEDICINE

## 2025-07-09 PROCEDURE — 3700000000 HC ANESTHESIA ATTENDED CARE: Performed by: INTERNAL MEDICINE

## 2025-07-09 PROCEDURE — 6360000002 HC RX W HCPCS: Performed by: NURSE ANESTHETIST, CERTIFIED REGISTERED

## 2025-07-09 PROCEDURE — 7100000000 HC PACU RECOVERY - FIRST 15 MIN: Performed by: INTERNAL MEDICINE

## 2025-07-09 PROCEDURE — 3600007502: Performed by: INTERNAL MEDICINE

## 2025-07-09 PROCEDURE — 2709999900 HC NON-CHARGEABLE SUPPLY: Performed by: INTERNAL MEDICINE

## 2025-07-09 PROCEDURE — 2580000003 HC RX 258: Performed by: ANESTHESIOLOGY

## 2025-07-09 RX ORDER — LIDOCAINE HYDROCHLORIDE 20 MG/ML
INJECTION, SOLUTION EPIDURAL; INFILTRATION; INTRACAUDAL; PERINEURAL
Status: DISCONTINUED | OUTPATIENT
Start: 2025-07-09 | End: 2025-07-09 | Stop reason: SDUPTHER

## 2025-07-09 RX ORDER — PROPOFOL 10 MG/ML
INJECTION, EMULSION INTRAVENOUS
Status: DISCONTINUED | OUTPATIENT
Start: 2025-07-09 | End: 2025-07-09 | Stop reason: SDUPTHER

## 2025-07-09 RX ORDER — DEXTROSE MONOHYDRATE AND SODIUM CHLORIDE 5; .45 G/100ML; G/100ML
INJECTION, SOLUTION INTRAVENOUS CONTINUOUS
Status: CANCELLED | OUTPATIENT
Start: 2025-07-09

## 2025-07-09 RX ORDER — LIDOCAINE HYDROCHLORIDE 10 MG/ML
1 INJECTION, SOLUTION EPIDURAL; INFILTRATION; INTRACAUDAL; PERINEURAL
Status: DISCONTINUED | OUTPATIENT
Start: 2025-07-09 | End: 2025-07-09 | Stop reason: HOSPADM

## 2025-07-09 RX ORDER — SODIUM CHLORIDE, SODIUM LACTATE, POTASSIUM CHLORIDE, CALCIUM CHLORIDE 600; 310; 30; 20 MG/100ML; MG/100ML; MG/100ML; MG/100ML
INJECTION, SOLUTION INTRAVENOUS CONTINUOUS
Status: DISCONTINUED | OUTPATIENT
Start: 2025-07-09 | End: 2025-07-09 | Stop reason: HOSPADM

## 2025-07-09 RX ADMIN — SODIUM CHLORIDE, SODIUM LACTATE, POTASSIUM CHLORIDE, AND CALCIUM CHLORIDE: 600; 310; 30; 20 INJECTION, SOLUTION INTRAVENOUS at 12:07

## 2025-07-09 RX ADMIN — PROPOFOL 150 MG: 10 INJECTION, EMULSION INTRAVENOUS at 13:34

## 2025-07-09 RX ADMIN — LIDOCAINE HYDROCHLORIDE 100 MG: 20 INJECTION, SOLUTION EPIDURAL; INFILTRATION; INTRACAUDAL; PERINEURAL at 13:35

## 2025-07-09 RX ADMIN — DEXTROSE MONOHYDRATE AND SODIUM CHLORIDE 500 ML: 5; .9 INJECTION, SOLUTION INTRAVENOUS at 13:15

## 2025-07-09 ASSESSMENT — PAIN - FUNCTIONAL ASSESSMENT
PAIN_FUNCTIONAL_ASSESSMENT: 0-10
PAIN_FUNCTIONAL_ASSESSMENT: 0-10

## 2025-07-09 NOTE — H&P
[facility]  [phone]      History and Physical    Patient: Cherrie Mayen MRN: [id number]  SSN: [id number]    YOB: 1970  Age: 55 y.o.  Sex: female      Subjective:      Cherrie Mayen is a 54-year-old female who is being seen today for a follow up visit.     1. RUQ pain - She reports 1 week constant RUQ pain which is worse w/ meals. Pain moderate in severity. Associated w/ decreased appetite and nausea. She has noticed some dark yellow urine but denies cola colored urine, acholic stools or jaundice. No prior history of liver, gallbladder or pancreas problems. She considered going to ER last weekend due to the pain, but elected to wait until visit today.     Of note, previously on Ozempic for DM, this was recently changed to Farxiga.     2. Dyspahgia/GERD - Chronic problem, but worse in past year, particularly the past 2 months. Associated w/ recurrent laryngitis, cough and dry mouth. She experiences a sensation of tightness in the neck/throat and difficulty swallowing pills and solids. She previously had EGD for similar symptoms in 2020 which was unremarkable. 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  She had been off pantoprazole and famotidine but recently resumed these which has helped some. She also tries to limit dietary triggers.     3. Constipation - She endorses chronic constipation, taking miralax as needed helps some. She denies rectal bleeding. Has not used bowel regimen daily.     4. CRCS - Her last colonoscopy was 6/4/2020 surveillance due in 2025 due to history of polyps.    Of note, several months ago she experienced episode of severe L sided jaw pain/numbness as well as generalized L sided pain (body, extremities). She was evaluated in ER and CVA was excluded. She was referred to Neurologist due to recurrent similar episodes and has appointment currently

## 2025-07-09 NOTE — DISCHARGE INSTRUCTIONS
dial 911.    Educational references and/or instructions provided during this visit included:    See Attached      APPOINTMENTS:    Per MD Instruction    Discharge information has been reviewed with the patient.  The patient verbalized understanding.

## 2025-07-09 NOTE — ANESTHESIA PRE PROCEDURE
Department of Anesthesiology  Preprocedure Note       Name:  Cherrie Mayen   Age:  55 y.o.  :  1970                                          MRN:  [id number]         Date:  2025      Surgeon: Surgeon(s):  Oksana Trevino MD    Procedure: Procedure(s):  ESOPHAGOGASTRODUODENOSCOPY    Medications prior to admission:   Prior to Admission medications    Medication Sig Start Date End Date Taking? Authorizing Provider   Semaglutide (OZEMPIC, 1 MG/DOSE, SC) Inject into the skin once a week   Yes ProviderSharmila MD   aspirin 81 MG EC tablet Take 1 tablet by mouth daily   Yes Sharmila Cruz MD   meloxicam (MOBIC) 15 MG tablet Take 1 tablet by mouth daily   Yes Sharmila Cruz MD   pantoprazole (PROTONIX) 40 MG tablet Take 1 tablet by mouth daily   Yes Sharmila Cruz MD   GABAPENTIN PO Take by mouth nightly   Yes Sharmila Cruz MD   ondansetron (ZOFRAN) 4 MG tablet Take 1 tablet by mouth every 8 hours as needed for Nausea or Vomiting   Yes Sharmila Cruz MD   atorvastatin (LIPITOR) 10 MG tablet TAKE 1 TABLET BY MOUTH EVERY DAY FOR 90 DAYS 22  Yes Automatic Reconciliation, Ar   Calcium Carbonate-Vitamin D 600-3.125 MG-MCG TABS Take by mouth   Yes Automatic Reconciliation, Ar   levothyroxine (SYNTHROID) 100 MCG tablet Take 112 mcg by mouth every morning (before breakfast)   Yes Automatic Reconciliation, Ar   metoprolol tartrate (LOPRESSOR) 25 MG tablet Take 1 tablet by mouth daily   Yes Automatic Reconciliation, Ar   albuterol (PROVENTIL) (2.5 MG/3ML) 0.083% nebulizer solution ceived the following from [facility]: Outside name: albuterol (PROVENTIL VENTOLIN) 2.5 mg /3 mL (0.083 %) nebu 22   Automatic Reconciliation, Ar   ibuprofen (ADVIL;MOTRIN) 800 MG tablet Take 800 mg by mouth every 8 hours as needed  Patient not taking: Reported on 2025    Automatic Reconciliation, Ar       Current medications:    Current Facility-Administered Medications

## 2025-07-09 NOTE — ANESTHESIA POSTPROCEDURE EVALUATION
Department of Anesthesiology  Postprocedure Note    Patient: Cherrie Mayen  MRN: [id number]  YOB: 1970  Date of evaluation: 7/9/2025    Procedure Summary       Date: 07/09/25 Room / Location: [facility] ENDO 02 / [facility] ENDOSCOPY    Anesthesia Start: 1331 Anesthesia Stop: 1342    Procedure: ESOPHAGOGASTRODUODENOSCOPY w/ 48F Dilation and BX (Upper GI Region) Diagnosis:       Abdominal pain, right upper quadrant      Gastroesophageal reflux disease, unspecified whether esophagitis present      (Abdominal pain, right upper quadrant [R10.11])      (Gastroesophageal reflux disease, unspecified whether esophagitis present [K21.9])    Surgeons: Oksana Trevino MD Responsible Provider: Sarah Reynoso MD    Anesthesia Type: MAC ASA Status: 3            Anesthesia Type: MAC    Anthony Phase I: Anthony Score: 10    Anthony Phase II: Anthony Score: 10    Anesthesia Post Evaluation    Patient location during evaluation: PACU  Patient participation: complete - patient participated  Level of consciousness: awake and alert  Pain score: 0  Airway patency: patent  Nausea & Vomiting: no nausea and no vomiting  Cardiovascular status: hemodynamically stable  Respiratory status: acceptable  Hydration status: euvolemic  Multimodal analgesia pain management approach  Pain management: adequate    No notable events documented.

## 2025-08-06 ENCOUNTER — HOSPITAL ENCOUNTER (EMERGENCY)
Age: 55
Discharge: HOME | End: 2025-08-06
Attending: FAMILY MEDICINE
Payer: MEDICAID

## 2025-08-06 ENCOUNTER — APPOINTMENT (OUTPATIENT)
Age: 55
End: 2025-08-06
Payer: MEDICAID

## 2025-08-06 VITALS
HEART RATE: 83 BPM | BODY MASS INDEX: 43.24 KG/M2 | HEIGHT: 62 IN | SYSTOLIC BLOOD PRESSURE: 119 MMHG | OXYGEN SATURATION: 99 % | TEMPERATURE: 98.3 F | DIASTOLIC BLOOD PRESSURE: 78 MMHG | WEIGHT: 235 LBS | RESPIRATION RATE: 15 BRPM

## 2025-08-06 DIAGNOSIS — D64.9 ANEMIA, UNSPECIFIED TYPE: ICD-10-CM

## 2025-08-06 DIAGNOSIS — R06.02 SHORTNESS OF BREATH: Primary | ICD-10-CM

## 2025-08-06 LAB
ALBUMIN SERPL-MCNC: 3.2 G/DL (ref 3.4–5)
ALBUMIN/GLOB SERPL: 0.7
ALP SERPL-CCNC: 91 U/L (ref 45–117)
ALT SERPL-CCNC: 23 U/L (ref 10–35)
ANION GAP SERPL CALC-SCNC: 11 MMOL/L
APPEARANCE UR: CLEAR
AST SERPL W P-5'-P-CCNC: 26 U/L (ref 10–38)
BASOPHILS # BLD: 0.02 K/UL (ref 0–0.1)
BASOPHILS NFR BLD: 0.3 % (ref 0–2)
BILIRUB SERPL-MCNC: 0.5 MG/DL (ref 0.2–1)
BILIRUB UR QL: NEGATIVE
BNP SERPL-MCNC: <36 PG/ML (ref 36–900)
BUN SERPL-MCNC: 13 MG/DL (ref 6–23)
BUN/CREAT SERPL: 21
CA-I BLD-MCNC: 9.5 MG/DL (ref 8.5–10.1)
CHLORIDE SERPL-SCNC: 102 MMOL/L (ref 98–107)
CO2 SERPL-SCNC: 27 MMOL/L (ref 21–32)
COLOR UR: YELLOW
CREAT SERPL-MCNC: 0.6 MG/DL (ref 0.6–1.3)
D DIMER PPP FEU-MCNC: 1.17 UG/ML(FEU)
DIFFERENTIAL METHOD BLD: ABNORMAL
EOSINOPHIL # BLD: 0.01 K/UL (ref 0–0.4)
EOSINOPHIL NFR BLD: 0.1 % (ref 0–5)
ERYTHROCYTE [DISTWIDTH] IN BLOOD BY AUTOMATED COUNT: 15.1 % (ref 11.6–14.5)
GLOBULIN SER CALC-MCNC: 4.6 G/DL
GLUCOSE SERPL-MCNC: 104 MG/DL (ref 74–108)
GLUCOSE UR STRIP.AUTO-MCNC: NEGATIVE MG/DL
HCT VFR BLD AUTO: 36.1 % (ref 35–45)
HGB BLD-MCNC: 11.5 G/DL (ref 12–16)
HGB UR QL STRIP: NEGATIVE
IMM GRANULOCYTES # BLD AUTO: 0.02 K/UL (ref 0–0.04)
IMM GRANULOCYTES NFR BLD AUTO: 0.3 % (ref 0–0.5)
KETONES UR QL STRIP.AUTO: NEGATIVE MG/DL
LEUKOCYTE ESTERASE UR QL STRIP.AUTO: NEGATIVE
LYMPHOCYTES # BLD: 1.87 K/UL (ref 0.9–3.6)
LYMPHOCYTES NFR BLD: 27.6 % (ref 21–52)
MCH RBC QN AUTO: 27.6 PG (ref 24–34)
MCHC RBC AUTO-ENTMCNC: 31.9 G/DL (ref 31–37)
MCV RBC AUTO: 86.6 FL (ref 78–100)
MONOCYTES # BLD: 0.55 K/UL (ref 0.05–1.2)
MONOCYTES NFR BLD: 8.1 % (ref 3–10)
NEUTS SEG # BLD: 4.3 K/UL (ref 1.8–8)
NEUTS SEG NFR BLD: 63.6 % (ref 40–73)
NITRITE UR QL STRIP.AUTO: NEGATIVE
NRBC # BLD: 0 K/UL (ref 0–0.01)
NRBC BLD-RTO: 0 PER 100 WBC
PH UR STRIP: 7.5 (ref 5–8)
PLATELET # BLD AUTO: 257 K/UL (ref 135–420)
PMV BLD AUTO: 9.5 FL (ref 9.2–11.8)
POTASSIUM SERPL-SCNC: 3.6 MMOL/L (ref 3.5–5.5)
PROT SERPL-MCNC: 7.9 G/DL (ref 6.4–8.2)
PROT UR STRIP-MCNC: NEGATIVE MG/DL
RBC # BLD AUTO: 4.17 M/UL (ref 4.2–5.3)
SODIUM SERPL-SCNC: 141 MMOL/L (ref 136–145)
SP GR UR REFRACTOMETRY: 1.03 (ref 1–1.03)
TROPONIN T SERPL HS-MCNC: <6 NG/L (ref 0–14)
UROBILINOGEN UR QL STRIP.AUTO: 1 EU/DL (ref 0.2–1)
WBC # BLD AUTO: 6.8 K/UL (ref 4.6–13.2)

## 2025-08-06 PROCEDURE — 6360000004 HC RX CONTRAST MEDICATION: Performed by: FAMILY MEDICINE

## 2025-08-06 PROCEDURE — 83880 ASSAY OF NATRIURETIC PEPTIDE: CPT

## 2025-08-06 PROCEDURE — 99285 EMERGENCY DEPT VISIT HI MDM: CPT

## 2025-08-06 PROCEDURE — 85379 FIBRIN DEGRADATION QUANT: CPT

## 2025-08-06 PROCEDURE — 36415 COLL VENOUS BLD VENIPUNCTURE: CPT

## 2025-08-06 PROCEDURE — 84484 ASSAY OF TROPONIN QUANT: CPT

## 2025-08-06 PROCEDURE — 71045 X-RAY EXAM CHEST 1 VIEW: CPT

## 2025-08-06 PROCEDURE — 80053 COMPREHEN METABOLIC PANEL: CPT

## 2025-08-06 PROCEDURE — 85025 COMPLETE CBC W/AUTO DIFF WBC: CPT

## 2025-08-06 PROCEDURE — 71275 CT ANGIOGRAPHY CHEST: CPT

## 2025-08-06 PROCEDURE — 93005 ELECTROCARDIOGRAM TRACING: CPT | Performed by: FAMILY MEDICINE

## 2025-08-06 PROCEDURE — 81003 URINALYSIS AUTO W/O SCOPE: CPT

## 2025-08-06 RX ORDER — IOPAMIDOL 755 MG/ML
100 INJECTION, SOLUTION INTRAVASCULAR
Status: COMPLETED | OUTPATIENT
Start: 2025-08-06 | End: 2025-08-06

## 2025-08-06 RX ADMIN — IOPAMIDOL 100 ML: 755 INJECTION, SOLUTION INTRAVENOUS at 22:32

## 2025-08-06 ASSESSMENT — LIFESTYLE VARIABLES
HOW MANY STANDARD DRINKS CONTAINING ALCOHOL DO YOU HAVE ON A TYPICAL DAY: PATIENT DOES NOT DRINK
HOW OFTEN DO YOU HAVE A DRINK CONTAINING ALCOHOL: NEVER

## 2025-08-07 LAB
EKG ATRIAL RATE: 83 BPM
EKG DIAGNOSIS: NORMAL
EKG P AXIS: 63 DEGREES
EKG P-R INTERVAL: 164 MS
EKG Q-T INTERVAL: 360 MS
EKG QRS DURATION: 76 MS
EKG QTC CALCULATION (BAZETT): 423 MS
EKG R AXIS: 5 DEGREES
EKG T AXIS: 49 DEGREES
EKG VENTRICULAR RATE: 83 BPM

## 2025-08-07 ASSESSMENT — ENCOUNTER SYMPTOMS
SHORTNESS OF BREATH: 1
WHEEZING: 0
COUGH: 0
CHEST TIGHTNESS: 0
GASTROINTESTINAL NEGATIVE: 1

## 2025-08-14 ENCOUNTER — HOSPITAL ENCOUNTER (EMERGENCY)
Age: 55
Discharge: HOME | End: 2025-08-14
Attending: FAMILY MEDICINE
Payer: MEDICAID

## 2025-08-14 ENCOUNTER — APPOINTMENT (OUTPATIENT)
Age: 55
End: 2025-08-14
Payer: MEDICAID

## 2025-08-14 VITALS
RESPIRATION RATE: 14 BRPM | WEIGHT: 230 LBS | BODY MASS INDEX: 42.33 KG/M2 | DIASTOLIC BLOOD PRESSURE: 75 MMHG | HEIGHT: 62 IN | SYSTOLIC BLOOD PRESSURE: 120 MMHG | OXYGEN SATURATION: 98 % | TEMPERATURE: 97.7 F | HEART RATE: 61 BPM

## 2025-08-14 DIAGNOSIS — R07.89 ATYPICAL CHEST PAIN: Primary | ICD-10-CM

## 2025-08-14 DIAGNOSIS — R14.2 BELCHING: ICD-10-CM

## 2025-08-14 DIAGNOSIS — R10.13 ABDOMINAL PAIN, EPIGASTRIC: ICD-10-CM

## 2025-08-14 LAB
ALBUMIN SERPL-MCNC: 3.3 G/DL (ref 3.4–5)
ALBUMIN/GLOB SERPL: 0.7
ALP SERPL-CCNC: 89 U/L (ref 45–117)
ALT SERPL-CCNC: 21 U/L (ref 10–35)
ANION GAP SERPL CALC-SCNC: 13 MMOL/L (ref 3–18)
AST SERPL W P-5'-P-CCNC: 23 U/L (ref 10–38)
BASOPHILS # BLD: 0.02 K/UL (ref 0–0.1)
BASOPHILS NFR BLD: 0.3 % (ref 0–2)
BILIRUB SERPL-MCNC: 0.5 MG/DL (ref 0.2–1)
BUN SERPL-MCNC: 14 MG/DL (ref 6–23)
BUN/CREAT SERPL: 28
CA-I BLD-MCNC: 9.5 MG/DL (ref 8.5–10.1)
CHLORIDE SERPL-SCNC: 102 MMOL/L (ref 98–107)
CO2 SERPL-SCNC: 26 MMOL/L (ref 21–32)
CREAT SERPL-MCNC: 0.51 MG/DL (ref 0.6–1.3)
DIFFERENTIAL METHOD BLD: ABNORMAL
EKG ATRIAL RATE: 66 BPM
EKG DIAGNOSIS: NORMAL
EKG P AXIS: 80 DEGREES
EKG P-R INTERVAL: 176 MS
EKG Q-T INTERVAL: 372 MS
EKG QRS DURATION: 76 MS
EKG QTC CALCULATION (BAZETT): 389 MS
EKG R AXIS: 14 DEGREES
EKG T AXIS: 4 DEGREES
EKG VENTRICULAR RATE: 66 BPM
EOSINOPHIL # BLD: 0.01 K/UL (ref 0–0.4)
EOSINOPHIL NFR BLD: 0.2 % (ref 0–5)
ERYTHROCYTE [DISTWIDTH] IN BLOOD BY AUTOMATED COUNT: 15.3 % (ref 11.6–14.5)
GLOBULIN SER CALC-MCNC: 4.8 G/DL
GLUCOSE SERPL-MCNC: 98 MG/DL (ref 74–108)
HCT VFR BLD AUTO: 36.2 % (ref 35–45)
HGB BLD-MCNC: 11.9 G/DL (ref 12–16)
IMM GRANULOCYTES # BLD AUTO: 0.03 K/UL (ref 0–0.04)
IMM GRANULOCYTES NFR BLD AUTO: 0.5 % (ref 0–0.5)
LIPASE SERPL-CCNC: 37 U/L (ref 13–75)
LYMPHOCYTES # BLD: 1.65 K/UL (ref 0.9–3.6)
LYMPHOCYTES NFR BLD: 28 % (ref 21–52)
MAGNESIUM SERPL-MCNC: 2.2 MG/DL (ref 1.6–2.6)
MCH RBC QN AUTO: 28.1 PG (ref 24–34)
MCHC RBC AUTO-ENTMCNC: 32.9 G/DL (ref 31–37)
MCV RBC AUTO: 85.4 FL (ref 78–100)
MONOCYTES # BLD: 0.44 K/UL (ref 0.05–1.2)
MONOCYTES NFR BLD: 7.5 % (ref 3–10)
NEUTS SEG # BLD: 3.74 K/UL (ref 1.8–8)
NEUTS SEG NFR BLD: 63.5 % (ref 40–73)
NRBC # BLD: 0 K/UL (ref 0–0.01)
NRBC BLD-RTO: 0 PER 100 WBC
PLATELET # BLD AUTO: 259 K/UL (ref 135–420)
PMV BLD AUTO: 9.4 FL (ref 9.2–11.8)
POTASSIUM SERPL-SCNC: 3.8 MMOL/L (ref 3.5–5.5)
PROT SERPL-MCNC: 8 G/DL (ref 6.4–8.2)
RBC # BLD AUTO: 4.24 M/UL (ref 4.2–5.3)
SODIUM SERPL-SCNC: 141 MMOL/L (ref 136–145)
TROPONIN T SERPL HS-MCNC: 10.2 NG/L (ref 0–14)
TROPONIN T SERPL HS-MCNC: 6.2 NG/L (ref 0–14)
WBC # BLD AUTO: 5.9 K/UL (ref 4.6–13.2)

## 2025-08-14 PROCEDURE — 96374 THER/PROPH/DIAG INJ IV PUSH: CPT

## 2025-08-14 PROCEDURE — 2500000003 HC RX 250 WO HCPCS: Performed by: FAMILY MEDICINE

## 2025-08-14 PROCEDURE — 36415 COLL VENOUS BLD VENIPUNCTURE: CPT

## 2025-08-14 PROCEDURE — 96375 TX/PRO/DX INJ NEW DRUG ADDON: CPT

## 2025-08-14 PROCEDURE — 6370000000 HC RX 637 (ALT 250 FOR IP): Performed by: FAMILY MEDICINE

## 2025-08-14 PROCEDURE — 83690 ASSAY OF LIPASE: CPT

## 2025-08-14 PROCEDURE — 80053 COMPREHEN METABOLIC PANEL: CPT

## 2025-08-14 PROCEDURE — 93005 ELECTROCARDIOGRAM TRACING: CPT | Performed by: FAMILY MEDICINE

## 2025-08-14 PROCEDURE — 71045 X-RAY EXAM CHEST 1 VIEW: CPT

## 2025-08-14 PROCEDURE — 6360000002 HC RX W HCPCS: Performed by: FAMILY MEDICINE

## 2025-08-14 PROCEDURE — 99285 EMERGENCY DEPT VISIT HI MDM: CPT

## 2025-08-14 PROCEDURE — 84484 ASSAY OF TROPONIN QUANT: CPT

## 2025-08-14 PROCEDURE — 83735 ASSAY OF MAGNESIUM: CPT

## 2025-08-14 PROCEDURE — 85025 COMPLETE CBC W/AUTO DIFF WBC: CPT

## 2025-08-14 RX ORDER — ONDANSETRON 2 MG/ML
4 INJECTION INTRAMUSCULAR; INTRAVENOUS ONCE
Status: COMPLETED | OUTPATIENT
Start: 2025-08-14 | End: 2025-08-14

## 2025-08-14 RX ORDER — PANTOPRAZOLE SODIUM 40 MG/10ML
40 INJECTION, POWDER, LYOPHILIZED, FOR SOLUTION INTRAVENOUS
Status: COMPLETED | OUTPATIENT
Start: 2025-08-14 | End: 2025-08-14

## 2025-08-14 RX ORDER — MAGNESIUM HYDROXIDE/ALUMINUM HYDROXICE/SIMETHICONE 120; 1200; 1200 MG/30ML; MG/30ML; MG/30ML
30 SUSPENSION ORAL
Status: COMPLETED | OUTPATIENT
Start: 2025-08-14 | End: 2025-08-14

## 2025-08-14 RX ADMIN — ONDANSETRON 4 MG: 2 INJECTION, SOLUTION INTRAMUSCULAR; INTRAVENOUS at 03:27

## 2025-08-14 RX ADMIN — ALUMINUM HYDROXIDE, MAGNESIUM HYDROXIDE, AND SIMETHICONE 30 ML: 200; 200; 20 SUSPENSION ORAL at 03:27

## 2025-08-14 RX ADMIN — FAMOTIDINE 20 MG: 10 INJECTION, SOLUTION INTRAVENOUS at 03:28

## 2025-08-14 RX ADMIN — PANTOPRAZOLE SODIUM 40 MG: 40 INJECTION, POWDER, FOR SOLUTION INTRAVENOUS at 03:28

## 2025-08-14 ASSESSMENT — PAIN DESCRIPTION - LOCATION
LOCATION: CHEST
LOCATION: CHEST

## 2025-08-14 ASSESSMENT — PAIN SCALES - GENERAL
PAINLEVEL_OUTOF10: 4
PAINLEVEL_OUTOF10: 2

## 2025-08-14 ASSESSMENT — PAIN DESCRIPTION - ORIENTATION
ORIENTATION: LEFT
ORIENTATION: LEFT

## 2025-08-14 ASSESSMENT — PAIN DESCRIPTION - DESCRIPTORS
DESCRIPTORS: PRESSURE
DESCRIPTORS: PRESSURE

## 2025-08-14 ASSESSMENT — ENCOUNTER SYMPTOMS
RESPIRATORY NEGATIVE: 1
NAUSEA: 1

## 2025-08-14 ASSESSMENT — PAIN - FUNCTIONAL ASSESSMENT: PAIN_FUNCTIONAL_ASSESSMENT: 0-10

## 2025-08-14 ASSESSMENT — PAIN DESCRIPTION - PAIN TYPE: TYPE: ACUTE PAIN

## (undated) DEVICE — ENDOSCOPY PUMP TUBING/ CAP SET: Brand: ERBE

## (undated) DEVICE — SNARE POLYP M W27MMXL240CM OVL STIFF DISP CAPTIVATOR

## (undated) DEVICE — 3M™ BAIR PAWS FLEX™ WARMING GOWN, STANDARD, 20 PER CASE 81003: Brand: BAIR PAWS™

## (undated) DEVICE — SUTURE MCRYL SZ 4-0 L18IN ABSRB UD L19MM PS-2 3/8 CIR PRIM Y496G

## (undated) DEVICE — AIRLIFE™ NASAL OXYGEN CANNULA CURVED, FLARED TIP WITH 14 FOOT (4.3 M) CRUSH-RESISTANT TUBING, OVER-THE-EAR STYLE: Brand: AIRLIFE™

## (undated) DEVICE — BASIN EMSIS 16OZ GRAPHITE PLAS KID SHP MOLD GRAD FOR ORAL

## (undated) DEVICE — LINER SUCT CANSTR 3000CC PLAS SFT PRE ASSEMB W/OUT TBNG W/

## (undated) DEVICE — BITE BLOCK ENDOSCP UNIV AD 6 TO 9.4 MM

## (undated) DEVICE — INTENDED FOR TISSUE SEPARATION, AND OTHER PROCEDURES THAT REQUIRE A SHARP SURGICAL BLADE TO PUNCTURE OR CUT.: Brand: BARD-PARKER SAFETY BLADES SIZE 11, STERILE

## (undated) DEVICE — CATHETER SUCT TR FL TIP 14FR W/ O CTRL

## (undated) DEVICE — SYR 50ML SLIP TIP NSAF LF STRL --

## (undated) DEVICE — FORCEPS BX L240CM JAW DIA2.4MM ORNG L CAP W/ NDL DISP RAD

## (undated) DEVICE — SYR 10ML LUER LOK 1/5ML GRAD --

## (undated) DEVICE — KENDALL SCD EXPRESS SLEEVES, KNEE LENGTH, MEDIUM: Brand: KENDALL SCD

## (undated) DEVICE — (D)PREP SKN CHLRAPRP APPL 26ML -- CONVERT TO ITEM 371833

## (undated) DEVICE — MEDI-VAC NON-CONDUCTIVE SUCTION TUBING: Brand: CARDINAL HEALTH

## (undated) DEVICE — SYRINGE MED 25GA 3ML L5/8IN SUBQ PLAS W/ DETACH NDL SFTY

## (undated) DEVICE — AIRLIFE™ NASAL OXYGEN CANNULA CURVED, NONFLARED TIP WITH 14 FOOT (4.3 M) CRUSH-RESISTANT TUBING, OVER-THE-EAR STYLE: Brand: AIRLIFE™

## (undated) DEVICE — SYRINGE MED 50ML LUERSLIP TIP

## (undated) DEVICE — SOLUTION IRRIG 1000ML H2O STRL BLT

## (undated) DEVICE — TRAP SPEC COLL POLYP POLYSTYR --

## (undated) DEVICE — FCPS RAD JAW 4LC 240CM W/NDL -- BX/20 RADIAL JAW 4

## (undated) DEVICE — GOWN ISOL IMPERV UNIV, DISP, OPEN BACK, BLUE --

## (undated) DEVICE — SYR 20ML LL STRL LF --

## (undated) DEVICE — (D)STRIP SKN CLSR 0.5X4IN WHT --

## (undated) DEVICE — CANNULA NSL AD TBNG L14FT STD PVC O2 CRV CONN NONFLARED NSL

## (undated) DEVICE — (D)GLOVE SURG ORTH 8 PWD LTX -- DISC BY MFR USE ITEM 278015

## (undated) DEVICE — FLEX ADVANTAGE 3000CC: Brand: FLEX ADVANTAGE

## (undated) DEVICE — ARTHROSCOPIC KNEE HOLDER: Brand: DEVON

## (undated) DEVICE — KNEE ARTHROSCOPY III-LF: Brand: MEDLINE INDUSTRIES, INC.

## (undated) DEVICE — UNDERPAD INCONT W23XL36IN STD BLU POLYPR BK FLUF SFT

## (undated) DEVICE — BASIN EMESIS 500CC ROSE 250/CS 60/PLT: Brand: MEDEGEN MEDICAL PRODUCTS, LLC

## (undated) DEVICE — FLUFF AND POLYMER UNDERPAD,EXTRA HEAVY: Brand: WINGS

## (undated) DEVICE — 3M™ STERI-STRIP™ COMPOUND BENZOIN TINCTURE 40 BAGS/CARTON 4 CARTONS/CASE C1544: Brand: 3M™ STERI-STRIP™

## (undated) DEVICE — STERILE POLYISOPRENE POWDER-FREE SURGICAL GLOVES: Brand: PROTEXIS

## (undated) DEVICE — CANNULA ORIG TL CLR W FOAM CUSHIONS AND 14FT SUPL TB 3 CHN

## (undated) DEVICE — [ARTHROSCOPY PUMP,  DO NOT USE IF PACKAGE IS DAMAGED,  KEEP DRY,  KEEP AWAY FROM SUNLIGHT,  PROTECT FROM HEAT AND RADIOACTIVE SOURCES.]: Brand: FLOSTEADY

## (undated) DEVICE — GAUZE,SPONGE,4"X4",16PLY,STRL,LF,10/TRAY: Brand: MEDLINE

## (undated) DEVICE — FORCEPS BX L240CM JAW DIA2.8MM L CAP W/ NDL MIC MESH TOOTH

## (undated) DEVICE — (D)GLOVE SURG TRIFLX 7.5 PWD -- DISC BY MFR USE ITEM 102005

## (undated) DEVICE — YANKAUER,SMOOTH HANDLE,HIGH CAPACITY: Brand: MEDLINE INDUSTRIES, INC.

## (undated) DEVICE — SNARE VASC L240CM LOOP W10MM SHTH DIA2.4MM RND STIFF CLD

## (undated) DEVICE — MEDI-VAC SUCTION HIGH CAPACITY: Brand: CARDINAL HEALTH

## (undated) DEVICE — (D)GLOVE SURG ORTH 7.5 PWD LTX -- DISC BY MFR USE ITEM 278014